# Patient Record
Sex: FEMALE | Race: OTHER | NOT HISPANIC OR LATINO | Employment: FULL TIME | URBAN - METROPOLITAN AREA
[De-identification: names, ages, dates, MRNs, and addresses within clinical notes are randomized per-mention and may not be internally consistent; named-entity substitution may affect disease eponyms.]

---

## 2022-12-21 ENCOUNTER — OFFICE VISIT (OUTPATIENT)
Dept: CARDIOLOGY | Facility: CLINIC | Age: 27
End: 2022-12-21
Payer: OTHER GOVERNMENT

## 2022-12-21 VITALS
OXYGEN SATURATION: 100 % | SYSTOLIC BLOOD PRESSURE: 120 MMHG | HEART RATE: 81 BPM | DIASTOLIC BLOOD PRESSURE: 86 MMHG | WEIGHT: 129.75 LBS | RESPIRATION RATE: 16 BRPM

## 2022-12-21 DIAGNOSIS — I10 PRIMARY HYPERTENSION: Primary | ICD-10-CM

## 2022-12-21 DIAGNOSIS — I10 HYPERTENSION, UNSPECIFIED TYPE: ICD-10-CM

## 2022-12-21 PROCEDURE — 99999 PR PBB SHADOW E&M-NEW PATIENT-LVL III: CPT | Mod: PBBFAC,,, | Performed by: INTERNAL MEDICINE

## 2022-12-21 PROCEDURE — 93010 EKG 12-LEAD: ICD-10-PCS | Mod: S$PBB,,, | Performed by: INTERNAL MEDICINE

## 2022-12-21 PROCEDURE — 99204 PR OFFICE/OUTPT VISIT, NEW, LEVL IV, 45-59 MIN: ICD-10-PCS | Mod: S$PBB,,, | Performed by: INTERNAL MEDICINE

## 2022-12-21 PROCEDURE — 99999 PR PBB SHADOW E&M-NEW PATIENT-LVL III: ICD-10-PCS | Mod: PBBFAC,,, | Performed by: INTERNAL MEDICINE

## 2022-12-21 PROCEDURE — 99204 OFFICE O/P NEW MOD 45 MIN: CPT | Mod: S$PBB,,, | Performed by: INTERNAL MEDICINE

## 2022-12-21 PROCEDURE — 93005 ELECTROCARDIOGRAM TRACING: CPT | Mod: PBBFAC | Performed by: INTERNAL MEDICINE

## 2022-12-21 PROCEDURE — 93010 ELECTROCARDIOGRAM REPORT: CPT | Mod: S$PBB,,, | Performed by: INTERNAL MEDICINE

## 2022-12-21 PROCEDURE — 99203 OFFICE O/P NEW LOW 30 MIN: CPT | Mod: PBBFAC | Performed by: INTERNAL MEDICINE

## 2022-12-21 RX ORDER — HYDRALAZINE HYDROCHLORIDE 25 MG/1
25 TABLET, FILM COATED ORAL
Qty: 90 TABLET | Refills: 3 | Status: SHIPPED | OUTPATIENT
Start: 2022-12-21 | End: 2022-12-21

## 2022-12-21 RX ORDER — SERTRALINE HYDROCHLORIDE 100 MG/1
150 TABLET, FILM COATED ORAL
COMMUNITY
Start: 2022-12-10 | End: 2023-01-04 | Stop reason: SDUPTHER

## 2022-12-21 RX ORDER — PROPRANOLOL HYDROCHLORIDE 20 MG/1
20 TABLET ORAL
Qty: 90 TABLET | Refills: 11 | Status: SHIPPED | OUTPATIENT
Start: 2022-12-21 | End: 2023-01-04

## 2022-12-21 RX ORDER — BUSPIRONE HYDROCHLORIDE 10 MG/1
10 TABLET ORAL 2 TIMES DAILY
COMMUNITY
Start: 2022-12-16 | End: 2023-01-04 | Stop reason: SDUPTHER

## 2022-12-21 RX ORDER — HYDROCHLOROTHIAZIDE 12.5 MG/1
12.5 CAPSULE ORAL
COMMUNITY
Start: 2022-10-17 | End: 2023-01-04

## 2022-12-21 RX ORDER — METOPROLOL SUCCINATE 25 MG/1
25 TABLET, EXTENDED RELEASE ORAL DAILY
Qty: 90 TABLET | Refills: 3 | Status: SHIPPED | OUTPATIENT
Start: 2022-12-21 | End: 2024-01-22 | Stop reason: SDUPTHER

## 2022-12-21 RX ORDER — HYDRALAZINE HYDROCHLORIDE 25 MG/1
25 TABLET, FILM COATED ORAL 3 TIMES DAILY
Qty: 90 TABLET | Refills: 3 | Status: SHIPPED | OUTPATIENT
Start: 2022-12-21 | End: 2022-12-21

## 2022-12-21 NOTE — PROGRESS NOTES
CARDIOVASCULAR CONSULTATION    REASON FOR CONSULT:   Shweta Jimenez is a 27 y.o. female who presents for evaluation    HISTORY OF PRESENT ILLNESS:     Patient is a pleasant 27-year-old lady.  Here for evaluation.  She was diagnosed with hypertension at the age of 19.  Took hydrochlorothiazide for 2 years and then stop taking hydrochlorothiazide because her blood pressure was well controlled.  She does not remember if secondary causes of hypertension were ever studied on her.  Now lately has been experiencing episodes of hypertension as high as 200 mmHg.  Associated with sweating.  She states that she has a history of panic attacks and is not sure if her panic is precipitating her hypertension on not.  She takes propranolol p.r.n. for her panic attacks and elevated blood pressure and that seems to work well for her      PAST MEDICAL HISTORY:   No past medical history on file.    PAST SURGICAL HISTORY:   No past surgical history on file.    ALLERGIES AND MEDICATION:   Review of patient's allergies indicates:  No Known Allergies     Medication List            Accurate as of December 21, 2022  1:57 PM. If you have any questions, ask your nurse or doctor.                START taking these medications      metoprolol succinate 25 MG 24 hr tablet  Commonly known as: TOPROL-XL  Take 1 tablet (25 mg total) by mouth once daily.  Started by: Stan Thompson MD     propranoloL 20 MG tablet  Commonly known as: INDERAL  Take 1 tablet (20 mg total) by mouth as needed (for htn).  Started by: Stan Thompson MD            CONTINUE taking these medications      busPIRone 10 MG tablet  Commonly known as: BUSPAR     hydroCHLOROthiazide 12.5 mg capsule  Commonly known as: MICROZIDE     sertraline 100 MG tablet  Commonly known as: ZOLOFT               Where to Get Your Medications        These medications were sent to [id number] - [place] - [address] [facility]  [address],  [place] [address]      Phone: [phone]   metoprolol succinate 25 MG 24 hr tablet  propranoloL 20 MG tablet         SOCIAL HISTORY:     Social History     Socioeconomic History    Marital status: Single       FAMILY HISTORY:   No family history on file.    REVIEW OF SYSTEMS:   Review of Systems   Constitutional: Negative.   HENT: Negative.     Eyes: Negative.    Respiratory: Negative.     Endocrine: Negative.    Hematologic/Lymphatic: Negative.    Skin: Negative.    Musculoskeletal: Negative.    Gastrointestinal: Negative.    Genitourinary: Negative.    Neurological: Negative.    Psychiatric/Behavioral: Negative.     Allergic/Immunologic: Negative.      A 10 point review of systems was performed and all the pertinent positives have been mentioned. Rest of review of systems was negative.        PHYSICAL EXAM:     Vitals:    12/21/22 1315   BP: 120/86   Pulse: 81   Resp: 16    There is no height or weight on file to calculate BMI.  Weight: 58.8 kg (129 lb 11.9 oz)         Physical Exam  Constitutional:       Appearance: Normal appearance. She is well-developed.   HENT:      Head: Normocephalic.   Eyes:      Pupils: Pupils are equal, round, and reactive to light.   Cardiovascular:      Rate and Rhythm: Normal rate and regular rhythm.   Pulmonary:      Effort: Pulmonary effort is normal.      Breath sounds: Normal breath sounds.   Abdominal:      General: Bowel sounds are normal.      Palpations: Abdomen is soft.      Tenderness: There is no abdominal tenderness.   Musculoskeletal:         General: Normal range of motion.      Cervical back: Normal range of motion and neck supple.   Skin:     General: Skin is warm.   Neurological:      Mental Status: She is alert and oriented to person, place, and time.         DATA:     Laboratory:  CBC:        CHEMISTRIES:        CARDIAC BIOMARKERS:        COAGS:        LIPIDS/LFTS:  Recent Labs   Lab 10/17/22  1137   Cholesterol 170   Triglycerides 53   HDL 57   LDL Calculated  102   Non-HDL Cholesterol 113       Hemoglobin A1C   Date Value Ref Range Status   10/17/2022 5.2 4.7 - 5.6 % Final       TSH        The ASCVD Risk score (Farideh DK, et al., 2019) failed to calculate for the following reasons:    The 2019 ASCVD risk score is only valid for ages 40 to 79             ASSESSMENT AND PLAN     Patient Active Problem List   Diagnosis    Hypertension     Patient with hypertension.  Rule out secondary causes of hypertension.  Check renal artery ultrasound.  Check 2D echocardiogram.  Check renin aldosterone ratio.  Check 24 hour urine metanephrine levels.  Propranolol p.r.n..  Switch from hydrochlorothiazide to Toprol-XL 25 mg daily titrate as needed.    Follow-up after testing            Thank you very much for involving me in the care of your patient.  Please do not hesitate to contact me if there are any questions.      Stan Thompson MD, FACC, [facility]  Interventional Cardiologist, [facility].           This note was dictated with the help of speech recognition software.  There might be un-intended errors and/or substitutions.

## 2022-12-22 ENCOUNTER — HOSPITAL ENCOUNTER (OUTPATIENT)
Dept: CARDIOLOGY | Facility: HOSPITAL | Age: 27
Discharge: HOME | End: 2022-12-22
Attending: INTERNAL MEDICINE
Payer: OTHER GOVERNMENT

## 2022-12-22 DIAGNOSIS — I10 PRIMARY HYPERTENSION: ICD-10-CM

## 2022-12-22 LAB
ABDOMINAL AORTA MID PSV: 87 CM/S
AV INDEX (PROSTH): 0.8
AV MEAN GRADIENT: 3 MMHG
AV PEAK GRADIENT: 5 MMHG
AV VALVE AREA: 2.94 CM2
AV VELOCITY RATIO: 0.68
CV ECHO LV RWT: 0.31 CM
DOP CALC AO PEAK VEL: 1.14 M/S
DOP CALC AO VTI: 20.5 CM
DOP CALC LVOT AREA: 3.7 CM2
DOP CALC LVOT DIAMETER: 2.17 CM
DOP CALC LVOT PEAK VEL: 0.78 M/S
DOP CALC LVOT STROKE VOLUME: 60.25 CM3
DOP CALCLVOT PEAK VEL VTI: 16.3 CM
E WAVE DECELERATION TIME: 205.76 MSEC
E/A RATIO: 1.91
E/E' RATIO: 5.13 M/S
ECHO LV POSTERIOR WALL: 0.7 CM (ref 0.6–1.1)
EJECTION FRACTION: 60 %
FRACTIONAL SHORTENING: 32 % (ref 28–44)
INTERVENTRICULAR SEPTUM: 0.79 CM (ref 0.6–1.1)
IVRT: 110.37 MSEC
LA MINOR: 4.59 CM
LA WIDTH: 4.6 CM
LEFT ATRIUM SIZE: 3.92 CM
LEFT INTERNAL DIMENSION IN SYSTOLE: 3.06 CM (ref 2.1–4)
LEFT RENAL DIST DIAS: 29 CM/S
LEFT RENAL DIST SYS: 75 CM/S
LEFT RENAL MID DIAS: 24 CM/S
LEFT RENAL MID RAR: 0.67
LEFT RENAL MID SYS: 58 CM/S
LEFT RENAL ORIGIN DIAS: 46 CM/S
LEFT RENAL ORIGIN SYS: 111 CM/S
LEFT RENAL PROX DIAS: 67 CM/S
LEFT RENAL PROX SYS: 132 CM/S
LEFT RENAL ULTRASOUND ACCELERATION TIME MEASUREMENT 1: 66 MS
LEFT RENAL ULTRASOUND ACCELERATION TIME MEASUREMENT 2: 24 MS
LEFT RENAL ULTRASOUND ACCELERATION TIME MEASUREMENT 3: 66 MS
LEFT RENAL ULTRASOUND ACCELERATION TIME MEASUREMENT AVERAGE: 66 MS
LEFT RENAL ULTRASOUND KIDNEY SIZE MEASUREMENT 1: 10.2 CM
LEFT RENAL ULTRASOUND KIDNEY SIZE MEASUREMENT 2: 10.1 CM
LEFT RENAL ULTRASOUND KIDNEY SIZE MEASUREMENT 3: 9.8 CM
LEFT RENAL ULTRASOUND KIDNEY SIZE MEASUREMENT AVERAGE: 10.2 CM
LEFT RENAL ULTRASOUND RESISTIVE INDEX MEASUREMENT 1: 0.52
LEFT RENAL ULTRASOUND RESISTIVE INDEX MEASUREMENT 2: 0.56
LEFT RENAL ULTRASOUND RESISTIVE INDEX MEASUREMENT 3: 0.59
LEFT RENAL ULTRASOUND RESISTIVE INDEX MEASUREMENT AVERAGE: 0.59
LEFT VENTRICLE DIASTOLIC VOLUME: 91.34 ML
LEFT VENTRICLE SYSTOLIC VOLUME: 36.63 ML
LEFT VENTRICULAR INTERNAL DIMENSION IN DIASTOLE: 4.48 CM (ref 3.5–6)
LEFT VENTRICULAR MASS: 102.82 G
LV LATERAL E/E' RATIO: 4.56 M/S
LV SEPTAL E/E' RATIO: 5.86 M/S
LVOT MG: 1.44 MMHG
LVOT MV: 0.58 CM/S
MID AORTIC TRANS: 1.3 CM
MV PEAK A VEL: 0.43 M/S
MV PEAK E VEL: 0.82 M/S
MV STENOSIS PRESSURE HALF TIME: 59.67 MS
MV VALVE AREA P 1/2 METHOD: 3.69 CM2
OHS CV LEFT RENAL RAR: 1.52
OHS CV RIGHT RENAL RAR: 0.82
OHS CV US LEFT RENAL HIGHEST EDV: 67
OHS CV US LEFT RENAL HIGHEST PSV: 132
OHS CV US RIGHT RENAL HIGHEST EDV: 30
OHS CV US RIGHT RENAL HIGHEST PSV: 71
PISA TR MAX VEL: 2.36 M/S
PV PEAK VELOCITY: 1.13 CM/S
RA MAJOR: 4.82 CM
RA PRESSURE: 3 MMHG
RA WIDTH: 3.94 CM
RIGHT RENAL DIST DIAS: 30 CM/S
RIGHT RENAL DIST SYS: 70 CM/S
RIGHT RENAL MID DIAS: 28 CM/S
RIGHT RENAL MID RAR: 0.8
RIGHT RENAL MID SYS: 70 CM/S
RIGHT RENAL ORIGIN DIAS: 21 CM/S
RIGHT RENAL ORIGIN SYS: 71 CM/S
RIGHT RENAL PROX DIAS: 20 CM/S
RIGHT RENAL PROX SYS: 71 CM/S
RIGHT RENAL ULTRASOUND ACCELERATION TIME MEASUREMENT 1: 114 MS
RIGHT RENAL ULTRASOUND ACCELERATION TIME MEASUREMENT 2: 84 MS
RIGHT RENAL ULTRASOUND ACCELERATION TIME MEASUREMENT 3: 72 MS
RIGHT RENAL ULTRASOUND ACCELERATION TIME MEASUREMENT AVERAGE: 114 MS
RIGHT RENAL ULTRASOUND KIDNEY SIZE MEASUREMENT 1: 11.4 CM
RIGHT RENAL ULTRASOUND KIDNEY SIZE MEASUREMENT 2: 10.6 CM
RIGHT RENAL ULTRASOUND KIDNEY SIZE MEASUREMENT 3: 10.4 CM
RIGHT RENAL ULTRASOUND KIDNEY SIZE MEASUREMENT AVERAGE: 11.4 CM
RIGHT RENAL ULTRASOUND RESISTIVE INDEX MEASUREMENT 1: 0.54
RIGHT RENAL ULTRASOUND RESISTIVE INDEX MEASUREMENT 2: 0.66
RIGHT RENAL ULTRASOUND RESISTIVE INDEX MEASUREMENT 3: 0.56
RIGHT RENAL ULTRASOUND RESISTIVE INDEX MEASUREMENT AVERAGE: 0.66
RIGHT VENTRICULAR END-DIASTOLIC DIMENSION: 3.91 CM
SINUS: 3.17 CM
STJ: 2.87 CM
TDI LATERAL: 0.18 M/S
TDI SEPTAL: 0.14 M/S
TDI: 0.16 M/S
TR MAX PG: 22 MMHG
TRICUSPID ANNULAR PLANE SYSTOLIC EXCURSION: 1.97 CM
TV REST PULMONARY ARTERY PRESSURE: 25 MMHG

## 2022-12-22 PROCEDURE — 93306 TTE W/DOPPLER COMPLETE: CPT | Mod: 26,,, | Performed by: INTERNAL MEDICINE

## 2022-12-22 PROCEDURE — 93975 VASCULAR STUDY: CPT

## 2022-12-22 PROCEDURE — 93306 TTE W/DOPPLER COMPLETE: CPT

## 2022-12-22 PROCEDURE — 93975 CV US RENAL ARTERY STENOSIS HYPERTENSION COMPLETE (CUPID ONLY): ICD-10-PCS | Mod: 26,,, | Performed by: INTERNAL MEDICINE

## 2022-12-22 PROCEDURE — 93306 ECHO (CUPID ONLY): ICD-10-PCS | Mod: 26,,, | Performed by: INTERNAL MEDICINE

## 2022-12-22 PROCEDURE — 93975 VASCULAR STUDY: CPT | Mod: 26,,, | Performed by: INTERNAL MEDICINE

## 2023-01-03 NOTE — PROGRESS NOTES
Subjective:       Patient ID: Shweta Dela Cruz is a 27 y.o. female.    Chief Complaint: Establish Care, Anxiety (Daily ), and Panic Attack (Often and it may be partial because of work. 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 )        Shweta Dela Cruz is a 27 y.o. female who presents today for evaluation/treatment of anxiety.     Anxiety  Patient is here for evaluation of anxiety.  She has the following anxiety symptoms: shortness of breath, shaking, ect . Onset of symptoms was approximately several years ago.  Symptoms have been stable since that time. She is on Metoprolol daily to help with her blood pressure and control her anxiety. Additionally she is on Buspar and Zoloft. She feels her baseline anxiety is relatively well controlled but notes she is having panic attacks. 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      Past Medical History:   Diagnosis Date    Anxiety     Depression     Hypertension      Past Surgical History:   Procedure Laterality Date    ASD REPAIR  2003     Social History     Socioeconomic History    Marital status: Single   Tobacco Use    Smoking status: Never    Smokeless tobacco: Never   Substance and Sexual Activity    Alcohol use: Yes    Drug use: Never    Sexual activity: Yes     Partners: Female     Birth control/protection: None     History reviewed. No pertinent family history.      Health Maintenance    Flu Vaccine: 10/03/2022  Tetanus/Tdap: 09/09/2022  Hepatitis C Screen: 10/17/2022  HIV Screen: 10/17/2022      Patient ambulates on her own without an assistive device.     Have you ever used tobacco products? No    Have you ever been screened for HIV? Yes    Do you use condoms for protection against STD No    If female and sex with a male partner, do either of you use protection against pregnancy?  N/A    Do you still have a menstrual cycle? Yes          If yes, please describe your cycles: Regular    How often do  "you drink alcohol?  Very rarely    Review of patient's allergies indicates:  No Known Allergies    Medication List with Changes/Refills   Current Medications    METOPROLOL SUCCINATE (TOPROL-XL) 25 MG 24 HR TABLET    Take 1 tablet (25 mg total) by mouth once daily.   Changed and/or Refilled Medications    Modified Medication Previous Medication    BUSPIRONE (BUSPAR) 10 MG TABLET busPIRone (BUSPAR) 10 MG tablet       Take 1 tablet (10 mg total) by mouth 3 (three) times daily.    Take 10 mg by mouth 2 (two) times daily.    SERTRALINE (ZOLOFT) 100 MG TABLET sertraline (ZOLOFT) 100 MG tablet       Take 1.5 tablets (150 mg total) by mouth once daily.    Take 150 mg by mouth.   Discontinued Medications    HYDROCHLOROTHIAZIDE (MICROZIDE) 12.5 MG CAPSULE    Take 12.5 mg by mouth.    PROPRANOLOL (INDERAL) 20 MG TABLET    Take 1 tablet (20 mg total) by mouth as needed (for htn).       Review of Systems   Constitutional:  Negative for chills and fever.   Respiratory:  Negative for cough and shortness of breath.    Cardiovascular:  Negative for chest pain.   Neurological:  Negative for dizziness and headaches.   Psychiatric/Behavioral:  The patient is nervous/anxious.      Objective:   /82 (BP Location: Right arm, Patient Position: Sitting, BP Method: Small (Manual))   Pulse 61   Resp 18   Ht 5' 2.5" (1.588 m)   Wt 57.3 kg (126 lb 5.2 oz)   LMP 12/10/2022   SpO2 99%   BMI 22.74 kg/m²     Physical Exam  Vitals reviewed.   Constitutional:       Appearance: Normal appearance.   HENT:      Head: Normocephalic and atraumatic.   Cardiovascular:      Rate and Rhythm: Normal rate and regular rhythm.      Heart sounds: Normal heart sounds. No murmur heard.  Pulmonary:      Effort: Pulmonary effort is normal.      Breath sounds: Normal breath sounds. No wheezing.   Abdominal:      General: Bowel sounds are normal.   Skin:     General: Skin is warm and dry.   Neurological:      Mental Status: She is alert and oriented to " person, place, and time.     Assessment and Plan:       1. AUGUSTUS (generalized anxiety disorder)    Chronic, currently not well controlled. Will increase Buspar from BID to TID. Monitor for symptom improvement.     - busPIRone (BUSPAR) 10 MG tablet; Take 1 tablet (10 mg total) by mouth 3 (three) times daily.  Dispense: 270 tablet; Refill: 3    2. Depression, unspecified depression type    Chronic, stable. Recently in the past 3 months went up to 150 mg per day. Will keep at this dose for now and titrate up Buspar. Can increase to 200 mg if necessary.     - sertraline (ZOLOFT) 100 MG tablet; Take 1.5 tablets (150 mg total) by mouth once daily.  Dispense: 135 tablet; Refill: 3    3. Essential hypertension    Chronic, stable/improved, continue Metoprolol, f/u with Cardiology    Patient had recent labs at [facility] in 10/2022 -- to make appt with MD in clinic for annual once new insurance is in effect.

## 2023-01-04 ENCOUNTER — OFFICE VISIT (OUTPATIENT)
Dept: INTERNAL MEDICINE | Facility: CLINIC | Age: 28
End: 2023-01-04
Payer: OTHER GOVERNMENT

## 2023-01-04 VITALS
DIASTOLIC BLOOD PRESSURE: 82 MMHG | SYSTOLIC BLOOD PRESSURE: 100 MMHG | OXYGEN SATURATION: 99 % | RESPIRATION RATE: 18 BRPM | HEIGHT: 63 IN | HEART RATE: 61 BPM | BODY MASS INDEX: 22.38 KG/M2 | WEIGHT: 126.31 LBS

## 2023-01-04 DIAGNOSIS — F41.1 GAD (GENERALIZED ANXIETY DISORDER): Primary | ICD-10-CM

## 2023-01-04 DIAGNOSIS — I10 ESSENTIAL HYPERTENSION: ICD-10-CM

## 2023-01-04 DIAGNOSIS — F32.A DEPRESSION, UNSPECIFIED DEPRESSION TYPE: ICD-10-CM

## 2023-01-04 PROCEDURE — 99213 OFFICE O/P EST LOW 20 MIN: CPT | Mod: PBBFAC,PO | Performed by: NURSE PRACTITIONER

## 2023-01-04 PROCEDURE — 99204 PR OFFICE/OUTPT VISIT, NEW, LEVL IV, 45-59 MIN: ICD-10-PCS | Mod: S$PBB,,, | Performed by: NURSE PRACTITIONER

## 2023-01-04 PROCEDURE — 99999 PR PBB SHADOW E&M-EST. PATIENT-LVL III: CPT | Mod: PBBFAC,,, | Performed by: NURSE PRACTITIONER

## 2023-01-04 PROCEDURE — 99999 PR PBB SHADOW E&M-EST. PATIENT-LVL III: ICD-10-PCS | Mod: PBBFAC,,, | Performed by: NURSE PRACTITIONER

## 2023-01-04 PROCEDURE — 99204 OFFICE O/P NEW MOD 45 MIN: CPT | Mod: S$PBB,,, | Performed by: NURSE PRACTITIONER

## 2023-01-04 RX ORDER — BUSPIRONE HYDROCHLORIDE 10 MG/1
10 TABLET ORAL 3 TIMES DAILY
Qty: 270 TABLET | Refills: 3 | Status: SHIPPED | OUTPATIENT
Start: 2023-01-04 | End: 2024-01-22

## 2023-01-04 RX ORDER — SERTRALINE HYDROCHLORIDE 100 MG/1
150 TABLET, FILM COATED ORAL DAILY
Qty: 135 TABLET | Refills: 3 | Status: SHIPPED | OUTPATIENT
Start: 2023-01-04 | End: 2024-01-19 | Stop reason: SDUPTHER

## 2023-01-05 ENCOUNTER — OFFICE VISIT (OUTPATIENT)
Dept: CARDIOLOGY | Facility: CLINIC | Age: 28
End: 2023-01-05
Payer: OTHER GOVERNMENT

## 2023-01-05 VITALS
RESPIRATION RATE: 18 BRPM | BODY MASS INDEX: 22.71 KG/M2 | HEART RATE: 62 BPM | SYSTOLIC BLOOD PRESSURE: 112 MMHG | OXYGEN SATURATION: 98 % | HEIGHT: 63 IN | WEIGHT: 128.19 LBS | DIASTOLIC BLOOD PRESSURE: 62 MMHG

## 2023-01-05 DIAGNOSIS — I10 ESSENTIAL HYPERTENSION: ICD-10-CM

## 2023-01-05 DIAGNOSIS — I10 PRIMARY HYPERTENSION: Primary | ICD-10-CM

## 2023-01-05 PROCEDURE — 99214 OFFICE O/P EST MOD 30 MIN: CPT | Mod: S$PBB,,, | Performed by: INTERNAL MEDICINE

## 2023-01-05 PROCEDURE — 99999 PR PBB SHADOW E&M-EST. PATIENT-LVL IV: CPT | Mod: PBBFAC,,, | Performed by: INTERNAL MEDICINE

## 2023-01-05 PROCEDURE — 99214 PR OFFICE/OUTPT VISIT, EST, LEVL IV, 30-39 MIN: ICD-10-PCS | Mod: S$PBB,,, | Performed by: INTERNAL MEDICINE

## 2023-01-05 PROCEDURE — 99214 OFFICE O/P EST MOD 30 MIN: CPT | Mod: PBBFAC | Performed by: INTERNAL MEDICINE

## 2023-01-05 PROCEDURE — 99999 PR PBB SHADOW E&M-EST. PATIENT-LVL IV: ICD-10-PCS | Mod: PBBFAC,,, | Performed by: INTERNAL MEDICINE

## 2023-01-05 NOTE — PROGRESS NOTES
CARDIOVASCULAR CONSULTATION    REASON FOR CONSULT:   Shweta Dela Cruz is a 27 y.o. female who presents for evaluation    HISTORY OF PRESENT ILLNESS:     Patient is a pleasant 27-year-old lady.  Here for evaluation.  She was diagnosed with hypertension at the age of 19.  Took hydrochlorothiazide for 2 years and then stop taking hydrochlorothiazide because her blood pressure was well controlled.  She does not remember if secondary causes of hypertension were ever studied on her.  Now lately has been experiencing episodes of hypertension as high as 200 mmHg.  Associated with sweating.  She states that she has a history of panic attacks and is not sure if her panic is precipitating her hypertension on not.  She takes propranolol p.r.n. for her panic attacks and elevated blood pressure and that seems to work well for her    From January 23: Patient here for follow-up.  Has not had any further episodes of hypertension since being started on metoprolol.  Feels much better.  Echo was normal.  No renal artery stenosis was seen.  Renin aldosterone ratio was 13.  Metanephrine levels are pending and patient states that she is changing insurances and will get it done once the new insurance gets activated.            PAST MEDICAL HISTORY:     Past Medical History:   Diagnosis Date    Anxiety     Depression     Hypertension        PAST SURGICAL HISTORY:     Past Surgical History:   Procedure Laterality Date    ASD REPAIR  2003       ALLERGIES AND MEDICATION:   Review of patient's allergies indicates:  No Known Allergies     Medication List            Accurate as of January 5, 2023  9:55 AM. If you have any questions, ask your nurse or doctor.                CONTINUE taking these medications      busPIRone 10 MG tablet  Commonly known as: BUSPAR  Take 1 tablet (10 mg total) by mouth 3 (three) times daily.     metoprolol succinate 25 MG 24 hr tablet  Commonly known as: TOPROL-XL  Take 1 tablet (25 mg total) by mouth once daily.    "  sertraline 100 MG tablet  Commonly known as: ZOLOFT  Take 1.5 tablets (150 mg total) by mouth once daily.              SOCIAL HISTORY:     Social History     Socioeconomic History    Marital status: Single   Tobacco Use    Smoking status: Never    Smokeless tobacco: Never   Substance and Sexual Activity    Alcohol use: Yes    Drug use: Never    Sexual activity: Yes     Partners: Female     Birth control/protection: None       FAMILY HISTORY:   No family history on file.    REVIEW OF SYSTEMS:   Review of Systems   Constitutional: Negative.   HENT: Negative.     Eyes: Negative.    Respiratory: Negative.     Endocrine: Negative.    Hematologic/Lymphatic: Negative.    Skin: Negative.    Musculoskeletal: Negative.    Gastrointestinal: Negative.    Genitourinary: Negative.    Neurological: Negative.    Psychiatric/Behavioral: Negative.     Allergic/Immunologic: Negative.      A 10 point review of systems was performed and all the pertinent positives have been mentioned. Rest of review of systems was negative.        PHYSICAL EXAM:     Vitals:    01/05/23 0944   BP: 112/62   Pulse: 62   Resp: 18    Body mass index is 23.07 kg/m².  Weight: 58.2 kg (128 lb 3.2 oz)   Height: 5' 2.5" (158.8 cm)     Physical Exam  Constitutional:       Appearance: Normal appearance. She is well-developed.   HENT:      Head: Normocephalic.   Eyes:      Pupils: Pupils are equal, round, and reactive to light.   Cardiovascular:      Rate and Rhythm: Normal rate and regular rhythm.   Pulmonary:      Effort: Pulmonary effort is normal.      Breath sounds: Normal breath sounds.   Abdominal:      General: Bowel sounds are normal.      Palpations: Abdomen is soft.      Tenderness: There is no abdominal tenderness.   Musculoskeletal:         General: Normal range of motion.      Cervical back: Normal range of motion and neck supple.   Skin:     General: Skin is warm.   Neurological:      Mental Status: She is alert and oriented to person, place, and " time.         DATA:     Laboratory:  CBC:        CHEMISTRIES:        CARDIAC BIOMARKERS:        COAGS:        LIPIDS/LFTS:  Recent Labs   Lab 10/17/22  1137   Cholesterol 170   Triglycerides 53   HDL 57   LDL Calculated 102   Non-HDL Cholesterol 113         Hemoglobin A1C   Date Value Ref Range Status   10/17/2022 5.2 4.7 - 5.6 % Final       TSH        The ASCVD Risk score (Farideh DK, et al., 2019) failed to calculate for the following reasons:    The 2019 ASCVD risk score is only valid for ages 40 to 79             ASSESSMENT AND PLAN     Patient Active Problem List   Diagnosis    Essential hypertension     Patient with hypertension.  Blood pressure much better controlled on Toprol-XL.  Continue Toprol-XL, titrate as needed.  Hydralazine p.r.n. renin aldosterone ratio is normal.  Urine metanephrine levels are pending and patient states she will get them done once she gets the new insurance.  No renal artery stenosis.  Normal LVEF    Follow-up in 6 months            Thank you very much for involving me in the care of your patient.  Please do not hesitate to contact me if there are any questions.      Stan Thompson MD, FACC, [facility]  Interventional Cardiologist, [facility].           This note was dictated with the help of speech recognition software.  There might be un-intended errors and/or substitutions.

## 2023-06-20 ENCOUNTER — PATIENT MESSAGE (OUTPATIENT)
Dept: INTERNAL MEDICINE | Facility: CLINIC | Age: 28
End: 2023-06-20
Payer: OTHER GOVERNMENT

## 2023-06-21 DIAGNOSIS — I10 HYPERTENSION, UNSPECIFIED TYPE: Primary | ICD-10-CM

## 2023-08-11 ENCOUNTER — PATIENT MESSAGE (OUTPATIENT)
Dept: INTERNAL MEDICINE | Facility: CLINIC | Age: 28
End: 2023-08-11
Payer: COMMERCIAL

## 2023-08-16 ENCOUNTER — OFFICE VISIT (OUTPATIENT)
Dept: OBSTETRICS AND GYNECOLOGY | Facility: CLINIC | Age: 28
End: 2023-08-16
Payer: COMMERCIAL

## 2023-08-16 ENCOUNTER — PATIENT MESSAGE (OUTPATIENT)
Dept: OBSTETRICS AND GYNECOLOGY | Facility: CLINIC | Age: 28
End: 2023-08-16

## 2023-08-16 VITALS
BODY MASS INDEX: 22.94 KG/M2 | SYSTOLIC BLOOD PRESSURE: 110 MMHG | WEIGHT: 127.44 LBS | DIASTOLIC BLOOD PRESSURE: 76 MMHG

## 2023-08-16 DIAGNOSIS — N92.0 MENORRHAGIA WITH REGULAR CYCLE: ICD-10-CM

## 2023-08-16 DIAGNOSIS — Z01.419 WELL WOMAN EXAM WITH ROUTINE GYNECOLOGICAL EXAM: Primary | ICD-10-CM

## 2023-08-16 DIAGNOSIS — F32.81 PMDD (PREMENSTRUAL DYSPHORIC DISORDER): ICD-10-CM

## 2023-08-16 PROCEDURE — 99999 PR PBB SHADOW E&M-EST. PATIENT-LVL III: ICD-10-PCS | Mod: PBBFAC,,, | Performed by: STUDENT IN AN ORGANIZED HEALTH CARE EDUCATION/TRAINING PROGRAM

## 2023-08-16 PROCEDURE — 99213 OFFICE O/P EST LOW 20 MIN: CPT | Mod: PBBFAC,PO | Performed by: STUDENT IN AN ORGANIZED HEALTH CARE EDUCATION/TRAINING PROGRAM

## 2023-08-16 PROCEDURE — 99385 PREV VISIT NEW AGE 18-39: CPT | Mod: S$GLB,,, | Performed by: STUDENT IN AN ORGANIZED HEALTH CARE EDUCATION/TRAINING PROGRAM

## 2023-08-16 PROCEDURE — 99999 PR PBB SHADOW E&M-EST. PATIENT-LVL III: CPT | Mod: PBBFAC,,, | Performed by: STUDENT IN AN ORGANIZED HEALTH CARE EDUCATION/TRAINING PROGRAM

## 2023-08-16 PROCEDURE — 99385 PR PREVENTIVE VISIT,NEW,18-39: ICD-10-PCS | Mod: S$GLB,,, | Performed by: STUDENT IN AN ORGANIZED HEALTH CARE EDUCATION/TRAINING PROGRAM

## 2023-08-16 PROCEDURE — 88175 CYTOPATH C/V AUTO FLUID REDO: CPT | Performed by: STUDENT IN AN ORGANIZED HEALTH CARE EDUCATION/TRAINING PROGRAM

## 2023-08-16 NOTE — PROGRESS NOTES
CC: Well woman exam    HPI:  Shweta Dela Cruz is a 28 y.o. female  presents for a well woman exam.  She reports heavy cycles with passage of clots since she started her periods (normal for her). History of PMDD, she is currently on zoloft and it is helping but she is interested in the option of starting birth control as well. Female partners, does not need pregnancy protection and has used OCPs in past but felt like sometimes it worsened her PMDD symptoms.       Patient history:   Past Medical History:   Diagnosis Date    Anxiety     Depression     Hypertension      Past Surgical History:   Procedure Laterality Date    ASD REPAIR       OB History    Para Term  AB Living   0 0 0 0 0 0   SAB IAB Ectopic Multiple Live Births   0 0 0 0 0       GYN  Menopausal: No  History of abnormal paps: DENIES  Abnormal or postmenopausal bleeding:  menorrhagia  History of abnormal mammograms:N/A   Family history of breast or ovarian cancer: DENIES  Any breast masses, pain, skin changes, or nipple discharge: DENIES  Possible recent STD exposure: denies  Contraception: None    Pap: No result found, Done today  Mammogram: N/A      History reviewed. No pertinent family history.  Social History     Tobacco Use    Smoking status: Never    Smokeless tobacco: Never   Substance Use Topics    Alcohol use: Yes    Drug use: Never     Allergies: Patient has no known allergies.    Current Outpatient Medications:     busPIRone (BUSPAR) 10 MG tablet, Take 1 tablet (10 mg total) by mouth 3 (three) times daily., Disp: 270 tablet, Rfl: 3    metoprolol succinate (TOPROL-XL) 25 MG 24 hr tablet, Take 1 tablet (25 mg total) by mouth once daily., Disp: 90 tablet, Rfl: 3    sertraline (ZOLOFT) 100 MG tablet, Take 1.5 tablets (150 mg total) by mouth once daily., Disp: 135 tablet, Rfl: 3       ROS:  GENERAL: Denies weight gain or weight loss. Feeling well overall.   SKIN: Denies rash or lesions.   HEAD: Denies head injury or  headache.   NODES: Denies enlarged lymph nodes.   CHEST: Denies chest pain or shortness of breath.   CARDIOVASCULAR: Denies palpitations or left sided chest pain.   ABDOMEN: No abdominal pain, constipation, diarrhea, nausea, vomiting or rectal bleeding.   URINARY: No frequency, dysuria, hematuria, or burning on urination.  REPRODUCTIVE: See HPI.   BREASTS: The patient performs breast self-examination and denies pain, lumps, or nipple discharge.   HEMATOLOGIC: No easy bruisability or excessive bleeding.  MUSCULOSKELETAL: Denies joint pain or swelling.   NEUROLOGIC: Denies syncope or weakness.   PSYCHIATRIC: Denies depression, anxiety or mood swings.    Objective:   /76   Wt 57.8 kg (127 lb 6.8 oz)   LMP 08/14/2023 (Exact Date)   BMI 22.94 kg/m²       Physical Exam:  APPEARANCE: Well nourished, well developed, in no acute distress.  AFFECT: WNL, alert and oriented x 3  SKIN: No acne or hirsutism  NECK: Neck symmetric without masses or thyromegaly  NODES: No inguinal, cervical, axillary, or femoral lymph node enlargement  CHEST: Good respiratory effect  ABDOMEN: Soft.  No tenderness or masses.  No hepatosplenomegaly.  No hernias.  BREASTS: Symmetrical, no skin changes or visible lesions.  No palpable masses, nipple discharge bilaterally.  PELVIC: Normal external genitalia without lesions.  Normal hair distribution.  Adequate perineal body, normal urethral meatus.  Vagina moist and well rugated without lesions or discharge.  Cervix pink, without lesions, discharge or tenderness.  No significant cystocele or rectocele.  Bimanual exam shows uterus to be normal size, regular, mobile and nontender.  Adnexa without masses or tenderness.   EXTREMITIES: No edema.    ASSESSMENT AND PLAN  1. Well woman exam with routine gynecological exam  Liquid-Based Pap Smear, Screening      2. PMDD (premenstrual dysphoric disorder)        3. Menorrhagia with regular cycle            Annual exam  Breast and pelvic exam: WNL  Patient  counseled on ASCCP guidelines for cervical cytology screening  Cervical screening: completed today   Patient counseled on current recommendations for breast cancer screening  Mammogram screening: at 40  STD testing: declined, not needed today   Contraception: discussed option to try Tana (studied in PMDD) vs Lo loestrin (lowest estrogen dose) to help with heavy periods and PMDD symptoms, patient would like to consider and will notify MD if she wants to proceed   Osteoporosis screening at 65  Tobacco cessation counseling n/a    She was counseled to follow up with her PCP for other routine health maintenance      Follow up in about 1 year (around 8/16/2024).      Steffi Garcia MD  OBGYN [facility]

## 2023-08-24 LAB
FINAL PATHOLOGIC DIAGNOSIS: NORMAL
Lab: NORMAL

## 2023-08-30 ENCOUNTER — PATIENT MESSAGE (OUTPATIENT)
Dept: OBSTETRICS AND GYNECOLOGY | Facility: CLINIC | Age: 28
End: 2023-08-30
Payer: COMMERCIAL

## 2023-08-30 DIAGNOSIS — F32.81 PMDD (PREMENSTRUAL DYSPHORIC DISORDER): Primary | ICD-10-CM

## 2023-08-31 RX ORDER — NORETHINDRONE ACETATE AND ETHINYL ESTRADIOL, ETHINYL ESTRADIOL AND FERROUS FUMARATE 1MG-10(24)
1 KIT ORAL DAILY
Qty: 90 TABLET | Refills: 3 | Status: SHIPPED | OUTPATIENT
Start: 2023-08-31 | End: 2024-08-30

## 2024-01-19 DIAGNOSIS — F32.A DEPRESSION, UNSPECIFIED DEPRESSION TYPE: ICD-10-CM

## 2024-01-19 RX ORDER — SERTRALINE HYDROCHLORIDE 100 MG/1
150 TABLET, FILM COATED ORAL DAILY
Qty: 135 TABLET | Refills: 3 | OUTPATIENT
Start: 2024-01-19

## 2024-01-19 RX ORDER — SERTRALINE HYDROCHLORIDE 100 MG/1
150 TABLET, FILM COATED ORAL DAILY
Qty: 45 TABLET | Refills: 0 | Status: SHIPPED | OUTPATIENT
Start: 2024-01-19 | End: 2024-01-22 | Stop reason: SDUPTHER

## 2024-01-19 NOTE — TELEPHONE ENCOUNTER
LOV 1/4/23  LRF 1/4/23        Patient Comment: Jaki been on this prescription for over 10 years and need a refill. I dont believe an appointment is needed, but if need be I request an emergency refill for two weeks and can make an appointment within that time.

## 2024-01-19 NOTE — TELEPHONE ENCOUNTER
Please let patient know that when you are on medications for chronic conditions it is required that be seen at least once per year to continue getting refills. I will send in a 30 day supply to allow her time to either make an appointment with me or make an appointment with an MD to establish care.

## 2024-01-20 DIAGNOSIS — F41.1 GAD (GENERALIZED ANXIETY DISORDER): ICD-10-CM

## 2024-01-22 ENCOUNTER — OFFICE VISIT (OUTPATIENT)
Dept: INTERNAL MEDICINE | Facility: CLINIC | Age: 29
End: 2024-01-22
Payer: COMMERCIAL

## 2024-01-22 ENCOUNTER — TELEPHONE (OUTPATIENT)
Dept: INTERNAL MEDICINE | Facility: CLINIC | Age: 29
End: 2024-01-22

## 2024-01-22 DIAGNOSIS — F41.1 GAD (GENERALIZED ANXIETY DISORDER): ICD-10-CM

## 2024-01-22 DIAGNOSIS — F32.A DEPRESSION, UNSPECIFIED DEPRESSION TYPE: ICD-10-CM

## 2024-01-22 DIAGNOSIS — I10 ESSENTIAL HYPERTENSION: Primary | ICD-10-CM

## 2024-01-22 PROCEDURE — 99214 OFFICE O/P EST MOD 30 MIN: CPT | Mod: 95,,, | Performed by: NURSE PRACTITIONER

## 2024-01-22 RX ORDER — METOPROLOL SUCCINATE 25 MG/1
25 TABLET, EXTENDED RELEASE ORAL DAILY
Qty: 90 TABLET | Refills: 0 | Status: SHIPPED | OUTPATIENT
Start: 2024-01-22 | End: 2024-03-13 | Stop reason: SDUPTHER

## 2024-01-22 RX ORDER — BUSPIRONE HYDROCHLORIDE 10 MG/1
10 TABLET ORAL 3 TIMES DAILY
Qty: 270 TABLET | Refills: 0 | Status: SHIPPED | OUTPATIENT
Start: 2024-01-22 | End: 2024-03-13 | Stop reason: SDUPTHER

## 2024-01-22 RX ORDER — SERTRALINE HYDROCHLORIDE 100 MG/1
150 TABLET, FILM COATED ORAL DAILY
Qty: 135 TABLET | Refills: 0 | Status: SHIPPED | OUTPATIENT
Start: 2024-01-22 | End: 2024-03-13 | Stop reason: SDUPTHER

## 2024-01-22 RX ORDER — BUSPIRONE HYDROCHLORIDE 10 MG/1
10 TABLET ORAL 3 TIMES DAILY
Qty: 270 TABLET | Refills: 3 | Status: SHIPPED | OUTPATIENT
Start: 2024-01-22 | End: 2024-01-22 | Stop reason: SDUPTHER

## 2024-01-22 NOTE — PROGRESS NOTES
Subjective:       Patient ID: Shweta Dela Cruz is a 29 y.o. female.    Chief Complaint: Medication Refill    Visit type: audiovisual    Patient is a 29 y.o. female who traditionally follows with Steffi Garcia MD located in [place] presenting today for medication refills.     Review of patient's allergies indicates:  No Known Allergies    Medication List with Changes/Refills   Current Medications    NORETHINDRONE-E.ESTRADIOL-IRON (LO LOESTRIN FE) 1 MG-10 MCG (24)/10 MCG (2) TAB    Take 1 tablet by mouth once daily.   Changed and/or Refilled Medications    Modified Medication Previous Medication    BUSPIRONE (BUSPAR) 10 MG TABLET busPIRone (BUSPAR) 10 MG tablet       Take 1 tablet (10 mg total) by mouth 3 (three) times daily.    TAKE 1 TABLET(10 MG) BY MOUTH THREE TIMES DAILY    METOPROLOL SUCCINATE (TOPROL-XL) 25 MG 24 HR TABLET metoprolol succinate (TOPROL-XL) 25 MG 24 hr tablet       Take 1 tablet (25 mg total) by mouth once daily.    Take 1 tablet (25 mg total) by mouth once daily.    SERTRALINE (ZOLOFT) 100 MG TABLET sertraline (ZOLOFT) 100 MG tablet       Take 1.5 tablets (150 mg total) by mouth once daily.    Take 1.5 tablets (150 mg total) by mouth once daily.      Objective:   There were no vitals taken for this visit.    Physical Exam  Vitals reviewed: Exam Limited due to Video Consultation.   Constitutional:       General: She is not in acute distress.  HENT:      Head: Normocephalic.   Neurological:      Mental Status: She is alert and oriented to person, place, and time.       Assessment and Plan:     1. AUGUSTUS (generalized anxiety disorder)  2. Depression, unspecified depression type    Chronic, stable, continue current medication. Patient encouraged to seek out PCP.    - busPIRone (BUSPAR) 10 MG tablet; Take 1 tablet (10 mg total) by mouth 3 (three) times daily.  Dispense: 270 tablet; Refill: 0  - sertraline (ZOLOFT) 100 MG tablet; Take 1.5 tablets (150 mg total) by mouth once daily.  Dispense:  135 tablet; Refill: 0    3. Essential hypertension    Chronic, continue current medication. Patient encouraged to seek out PCP.      - metoprolol succinate (TOPROL-XL) 25 MG 24 hr tablet; Take 1 tablet (25 mg total) by mouth once daily.  Dispense: 90 tablet; Refill: 0      Face to Face time with patient: 4  10 minutes of total time spent on the encounter, which includes face to face time and non-face to face time preparing to see the patient (eg, review of tests), Obtaining and/or reviewing separately obtained history, Documenting clinical information in the electronic or other health record, Independently interpreting results (not separately reported) and communicating results to the patient/family/caregiver, or Care coordination (not separately reported).       Each patient to whom he or she provides medical services by telemedicine is:  (1) informed of the relationship between the physician and patient and the respective role of any other health care provider with respect to management of the patient; and (2) notified that he or she may decline to receive medical services by telemedicine and may withdraw from such care at any time.

## 2024-03-13 ENCOUNTER — OFFICE VISIT (OUTPATIENT)
Dept: INTERNAL MEDICINE | Facility: CLINIC | Age: 29
End: 2024-03-13
Payer: MEDICAID

## 2024-03-13 ENCOUNTER — LAB VISIT (OUTPATIENT)
Dept: LAB | Facility: HOSPITAL | Age: 29
End: 2024-03-13
Payer: MEDICAID

## 2024-03-13 VITALS
RESPIRATION RATE: 12 BRPM | TEMPERATURE: 98 F | DIASTOLIC BLOOD PRESSURE: 100 MMHG | SYSTOLIC BLOOD PRESSURE: 124 MMHG | WEIGHT: 128.5 LBS | HEART RATE: 60 BPM | BODY MASS INDEX: 22.77 KG/M2 | OXYGEN SATURATION: 98 % | HEIGHT: 63 IN

## 2024-03-13 DIAGNOSIS — Z00.00 ENCOUNTER FOR ANNUAL HEALTH EXAMINATION: ICD-10-CM

## 2024-03-13 DIAGNOSIS — M54.2 CERVICALGIA: ICD-10-CM

## 2024-03-13 DIAGNOSIS — Z78.9 VEGETARIAN DIET: ICD-10-CM

## 2024-03-13 DIAGNOSIS — F32.A DEPRESSION, UNSPECIFIED DEPRESSION TYPE: ICD-10-CM

## 2024-03-13 DIAGNOSIS — Z00.00 ENCOUNTER FOR ANNUAL HEALTH EXAMINATION: Primary | ICD-10-CM

## 2024-03-13 DIAGNOSIS — I10 ESSENTIAL HYPERTENSION: ICD-10-CM

## 2024-03-13 DIAGNOSIS — R51.9 ACUTE NONINTRACTABLE HEADACHE, UNSPECIFIED HEADACHE TYPE: ICD-10-CM

## 2024-03-13 DIAGNOSIS — F41.1 GAD (GENERALIZED ANXIETY DISORDER): ICD-10-CM

## 2024-03-13 LAB
ALBUMIN SERPL BCP-MCNC: 3.7 G/DL (ref 3.5–5.2)
ALP SERPL-CCNC: 43 U/L (ref 55–135)
ALT SERPL W/O P-5'-P-CCNC: 13 U/L (ref 10–44)
ANION GAP SERPL CALC-SCNC: 6 MMOL/L (ref 8–16)
AST SERPL-CCNC: 17 U/L (ref 10–40)
BASOPHILS # BLD AUTO: 0.03 K/UL (ref 0–0.2)
BASOPHILS NFR BLD: 0.4 % (ref 0–1.9)
BILIRUB SERPL-MCNC: 0.4 MG/DL (ref 0.1–1)
BUN SERPL-MCNC: 9 MG/DL (ref 6–20)
CALCIUM SERPL-MCNC: 9.7 MG/DL (ref 8.7–10.5)
CHLORIDE SERPL-SCNC: 110 MMOL/L (ref 95–110)
CHOLEST SERPL-MCNC: 176 MG/DL (ref 120–199)
CHOLEST/HDLC SERPL: 2.8 {RATIO} (ref 2–5)
CO2 SERPL-SCNC: 24 MMOL/L (ref 23–29)
CREAT SERPL-MCNC: 0.8 MG/DL (ref 0.5–1.4)
DIFFERENTIAL METHOD BLD: NORMAL
EOSINOPHIL # BLD AUTO: 0.2 K/UL (ref 0–0.5)
EOSINOPHIL NFR BLD: 3.2 % (ref 0–8)
ERYTHROCYTE [DISTWIDTH] IN BLOOD BY AUTOMATED COUNT: 13.7 % (ref 11.5–14.5)
EST. GFR  (NO RACE VARIABLE): >60 ML/MIN/1.73 M^2
FOLATE SERPL-MCNC: 13.3 NG/ML (ref 4–24)
GLUCOSE SERPL-MCNC: 90 MG/DL (ref 70–110)
HCT VFR BLD AUTO: 42.6 % (ref 37–48.5)
HDLC SERPL-MCNC: 63 MG/DL (ref 40–75)
HDLC SERPL: 35.8 % (ref 20–50)
HGB BLD-MCNC: 13.7 G/DL (ref 12–16)
IMM GRANULOCYTES # BLD AUTO: 0.03 K/UL (ref 0–0.04)
IMM GRANULOCYTES NFR BLD AUTO: 0.4 % (ref 0–0.5)
IRON SERPL-MCNC: 53 UG/DL (ref 30–160)
LDLC SERPL CALC-MCNC: 98.6 MG/DL (ref 63–159)
LYMPHOCYTES # BLD AUTO: 1.8 K/UL (ref 1–4.8)
LYMPHOCYTES NFR BLD: 24.3 % (ref 18–48)
MCH RBC QN AUTO: 28.5 PG (ref 27–31)
MCHC RBC AUTO-ENTMCNC: 32.2 G/DL (ref 32–36)
MCV RBC AUTO: 89 FL (ref 82–98)
MONOCYTES # BLD AUTO: 0.5 K/UL (ref 0.3–1)
MONOCYTES NFR BLD: 7.1 % (ref 4–15)
NEUTROPHILS # BLD AUTO: 4.9 K/UL (ref 1.8–7.7)
NEUTROPHILS NFR BLD: 64.6 % (ref 38–73)
NONHDLC SERPL-MCNC: 113 MG/DL
NRBC BLD-RTO: 0 /100 WBC
PLATELET # BLD AUTO: 329 K/UL (ref 150–450)
PMV BLD AUTO: 11.6 FL (ref 9.2–12.9)
POTASSIUM SERPL-SCNC: 4.6 MMOL/L (ref 3.5–5.1)
PROT SERPL-MCNC: 7.5 G/DL (ref 6–8.4)
RBC # BLD AUTO: 4.8 M/UL (ref 4–5.4)
SATURATED IRON: 10 % (ref 20–50)
SODIUM SERPL-SCNC: 140 MMOL/L (ref 136–145)
TOTAL IRON BINDING CAPACITY: 534 UG/DL (ref 250–450)
TRANSFERRIN SERPL-MCNC: 361 MG/DL (ref 200–375)
TRIGL SERPL-MCNC: 72 MG/DL (ref 30–150)
TSH SERPL DL<=0.005 MIU/L-ACNC: 2.13 UIU/ML (ref 0.4–4)
VIT B12 SERPL-MCNC: 806 PG/ML (ref 210–950)
WBC # BLD AUTO: 7.57 K/UL (ref 3.9–12.7)

## 2024-03-13 PROCEDURE — 3008F BODY MASS INDEX DOCD: CPT | Mod: CPTII,,, | Performed by: NURSE PRACTITIONER

## 2024-03-13 PROCEDURE — 83540 ASSAY OF IRON: CPT | Performed by: NURSE PRACTITIONER

## 2024-03-13 PROCEDURE — 3074F SYST BP LT 130 MM HG: CPT | Mod: CPTII,,, | Performed by: NURSE PRACTITIONER

## 2024-03-13 PROCEDURE — 3080F DIAST BP >= 90 MM HG: CPT | Mod: CPTII,,, | Performed by: NURSE PRACTITIONER

## 2024-03-13 PROCEDURE — 80053 COMPREHEN METABOLIC PANEL: CPT | Performed by: NURSE PRACTITIONER

## 2024-03-13 PROCEDURE — 99999 PR PBB SHADOW E&M-EST. PATIENT-LVL IV: CPT | Mod: PBBFAC,,, | Performed by: NURSE PRACTITIONER

## 2024-03-13 PROCEDURE — 1160F RVW MEDS BY RX/DR IN RCRD: CPT | Mod: CPTII,,, | Performed by: NURSE PRACTITIONER

## 2024-03-13 PROCEDURE — 85025 COMPLETE CBC W/AUTO DIFF WBC: CPT | Performed by: NURSE PRACTITIONER

## 2024-03-13 PROCEDURE — 99214 OFFICE O/P EST MOD 30 MIN: CPT | Mod: PBBFAC,PO | Performed by: NURSE PRACTITIONER

## 2024-03-13 PROCEDURE — 82607 VITAMIN B-12: CPT | Performed by: NURSE PRACTITIONER

## 2024-03-13 PROCEDURE — 80061 LIPID PANEL: CPT | Performed by: NURSE PRACTITIONER

## 2024-03-13 PROCEDURE — 84443 ASSAY THYROID STIM HORMONE: CPT | Performed by: NURSE PRACTITIONER

## 2024-03-13 PROCEDURE — 36415 COLL VENOUS BLD VENIPUNCTURE: CPT | Mod: PO | Performed by: NURSE PRACTITIONER

## 2024-03-13 PROCEDURE — 82746 ASSAY OF FOLIC ACID SERUM: CPT | Performed by: NURSE PRACTITIONER

## 2024-03-13 PROCEDURE — 99395 PREV VISIT EST AGE 18-39: CPT | Mod: S$PBB,,, | Performed by: NURSE PRACTITIONER

## 2024-03-13 PROCEDURE — 1159F MED LIST DOCD IN RCRD: CPT | Mod: CPTII,,, | Performed by: NURSE PRACTITIONER

## 2024-03-13 RX ORDER — BUSPIRONE HYDROCHLORIDE 10 MG/1
10 TABLET ORAL 3 TIMES DAILY
Qty: 270 TABLET | Refills: 3 | Status: SHIPPED | OUTPATIENT
Start: 2024-03-13

## 2024-03-13 RX ORDER — METOPROLOL SUCCINATE 25 MG/1
25 TABLET, EXTENDED RELEASE ORAL DAILY
Qty: 90 TABLET | Refills: 3 | Status: SHIPPED | OUTPATIENT
Start: 2024-03-13 | End: 2024-05-20

## 2024-03-13 RX ORDER — SERTRALINE HYDROCHLORIDE 100 MG/1
150 TABLET, FILM COATED ORAL DAILY
Qty: 135 TABLET | Refills: 3 | Status: SHIPPED | OUTPATIENT
Start: 2024-03-13 | End: 2024-05-20

## 2024-03-13 RX ORDER — PREDNISONE 20 MG/1
40 TABLET ORAL DAILY
Qty: 10 TABLET | Refills: 0 | Status: SHIPPED | OUTPATIENT
Start: 2024-03-13 | End: 2024-03-18

## 2024-03-13 RX ORDER — TIZANIDINE 2 MG/1
2 TABLET ORAL EVERY 8 HOURS PRN
Qty: 15 TABLET | Refills: 0 | Status: SHIPPED | OUTPATIENT
Start: 2024-03-13 | End: 2024-03-18

## 2024-05-18 DIAGNOSIS — I10 ESSENTIAL HYPERTENSION: ICD-10-CM

## 2024-05-19 DIAGNOSIS — F32.A DEPRESSION, UNSPECIFIED DEPRESSION TYPE: ICD-10-CM

## 2024-05-20 RX ORDER — METOPROLOL SUCCINATE 25 MG/1
25 TABLET, EXTENDED RELEASE ORAL
Qty: 30 TABLET | Refills: 0 | Status: SHIPPED | OUTPATIENT
Start: 2024-05-20

## 2024-05-20 RX ORDER — SERTRALINE HYDROCHLORIDE 100 MG/1
TABLET, FILM COATED ORAL
Qty: 135 TABLET | Refills: 3 | Status: SHIPPED | OUTPATIENT
Start: 2024-05-20

## 2024-07-20 DIAGNOSIS — F32.81 PMDD (PREMENSTRUAL DYSPHORIC DISORDER): ICD-10-CM

## 2024-07-20 RX ORDER — NORETHINDRONE ACETATE AND ETHINYL ESTRADIOL, ETHINYL ESTRADIOL AND FERROUS FUMARATE 1MG-10(24)
1 KIT ORAL
Qty: 84 TABLET | Refills: 0 | Status: SHIPPED | OUTPATIENT
Start: 2024-07-20 | End: 2024-07-24 | Stop reason: SDUPTHER

## 2024-07-21 NOTE — TELEPHONE ENCOUNTER
Refill Decision Note   Shweta Lonnie Dela Cruz  is requesting a refill authorization.  Brief Assessment and Rationale for Refill:  Approve     Medication Therapy Plan:        Comments:     Note composed:7:28 PM 07/20/2024

## 2024-07-23 ENCOUNTER — PATIENT MESSAGE (OUTPATIENT)
Dept: OBSTETRICS AND GYNECOLOGY | Facility: CLINIC | Age: 29
End: 2024-07-23
Payer: MEDICAID

## 2024-07-23 DIAGNOSIS — F32.81 PMDD (PREMENSTRUAL DYSPHORIC DISORDER): ICD-10-CM

## 2024-07-23 RX ORDER — NORETHINDRONE ACETATE AND ETHINYL ESTRADIOL, ETHINYL ESTRADIOL AND FERROUS FUMARATE 1MG-10(24)
1 KIT ORAL
Qty: 84 TABLET | Refills: 0 | OUTPATIENT
Start: 2024-07-23

## 2024-07-23 NOTE — TELEPHONE ENCOUNTER
Refill Decision Note  Quick DC. Request already responded to by other means (e.g. phone or fax)      Shweta Lonnie Dela Cruz  is requesting a refill authorization.  Brief Assessment and Rationale for Refill:  Quick Discontinue     Medication Therapy Plan:  : Receipt confirmed by pharmacy (7/20/2024  7:28 PM CDT) nusrat      Comments:     Note composed:12:54 PM 07/23/2024

## 2024-07-24 RX ORDER — NORETHINDRONE ACETATE AND ETHINYL ESTRADIOL, ETHINYL ESTRADIOL AND FERROUS FUMARATE 1MG-10(24)
1 KIT ORAL DAILY
Qty: 84 TABLET | Refills: 0 | Status: SHIPPED | OUTPATIENT
Start: 2024-07-24

## 2024-08-19 DIAGNOSIS — I10 ESSENTIAL HYPERTENSION: ICD-10-CM

## 2024-08-20 RX ORDER — METOPROLOL SUCCINATE 25 MG/1
25 TABLET, EXTENDED RELEASE ORAL
Qty: 90 TABLET | Refills: 0 | Status: SHIPPED | OUTPATIENT
Start: 2024-08-20

## 2024-08-20 NOTE — TELEPHONE ENCOUNTER
I spoke to the patient and informed her that her Rx has been refilled for 90 days.  She was also informed to establish care with a PCP for future refills.

## 2024-08-23 DIAGNOSIS — F32.81 PMDD (PREMENSTRUAL DYSPHORIC DISORDER): ICD-10-CM

## 2024-08-23 RX ORDER — NORETHINDRONE ACETATE AND ETHINYL ESTRADIOL, ETHINYL ESTRADIOL AND FERROUS FUMARATE 1MG-10(24)
1 KIT ORAL DAILY
Qty: 84 TABLET | Refills: 0 | Status: SHIPPED | OUTPATIENT
Start: 2024-08-23

## 2024-08-23 NOTE — TELEPHONE ENCOUNTER
Refill Decision Note   Shweta Lonnie Dela Cruz  is requesting a refill authorization.  Brief Assessment and Rationale for Refill:  Approve     Medication Therapy Plan:         Comments:     Note composed:2:00 PM 08/23/2024

## 2024-08-29 DIAGNOSIS — F32.A DEPRESSION, UNSPECIFIED DEPRESSION TYPE: ICD-10-CM

## 2024-08-29 RX ORDER — SERTRALINE HYDROCHLORIDE 100 MG/1
TABLET, FILM COATED ORAL
Qty: 90 TABLET | Refills: 1 | Status: SHIPPED | OUTPATIENT
Start: 2024-08-29

## 2024-09-09 ENCOUNTER — PATIENT MESSAGE (OUTPATIENT)
Dept: OBSTETRICS AND GYNECOLOGY | Facility: CLINIC | Age: 29
End: 2024-09-09

## 2024-09-09 ENCOUNTER — OFFICE VISIT (OUTPATIENT)
Dept: OBSTETRICS AND GYNECOLOGY | Facility: CLINIC | Age: 29
End: 2024-09-09
Payer: MEDICAID

## 2024-09-09 VITALS
SYSTOLIC BLOOD PRESSURE: 111 MMHG | BODY MASS INDEX: 21.89 KG/M2 | WEIGHT: 123.63 LBS | DIASTOLIC BLOOD PRESSURE: 77 MMHG

## 2024-09-09 DIAGNOSIS — F32.81 PMDD (PREMENSTRUAL DYSPHORIC DISORDER): ICD-10-CM

## 2024-09-09 PROCEDURE — 3008F BODY MASS INDEX DOCD: CPT | Mod: CPTII,,, | Performed by: STUDENT IN AN ORGANIZED HEALTH CARE EDUCATION/TRAINING PROGRAM

## 2024-09-09 PROCEDURE — 99999 PR PBB SHADOW E&M-EST. PATIENT-LVL II: CPT | Mod: PBBFAC,,, | Performed by: STUDENT IN AN ORGANIZED HEALTH CARE EDUCATION/TRAINING PROGRAM

## 2024-09-09 PROCEDURE — 3074F SYST BP LT 130 MM HG: CPT | Mod: CPTII,,, | Performed by: STUDENT IN AN ORGANIZED HEALTH CARE EDUCATION/TRAINING PROGRAM

## 2024-09-09 PROCEDURE — 3078F DIAST BP <80 MM HG: CPT | Mod: CPTII,,, | Performed by: STUDENT IN AN ORGANIZED HEALTH CARE EDUCATION/TRAINING PROGRAM

## 2024-09-09 PROCEDURE — 99212 OFFICE O/P EST SF 10 MIN: CPT | Mod: PBBFAC,PO | Performed by: STUDENT IN AN ORGANIZED HEALTH CARE EDUCATION/TRAINING PROGRAM

## 2024-09-09 PROCEDURE — 99395 PREV VISIT EST AGE 18-39: CPT | Mod: S$PBB,,, | Performed by: STUDENT IN AN ORGANIZED HEALTH CARE EDUCATION/TRAINING PROGRAM

## 2024-09-09 PROCEDURE — 1159F MED LIST DOCD IN RCRD: CPT | Mod: CPTII,,, | Performed by: STUDENT IN AN ORGANIZED HEALTH CARE EDUCATION/TRAINING PROGRAM

## 2024-09-09 RX ORDER — NORETHINDRONE ACETATE AND ETHINYL ESTRADIOL, ETHINYL ESTRADIOL AND FERROUS FUMARATE 1MG-10(24)
1 KIT ORAL DAILY
Qty: 84 TABLET | Refills: 5 | Status: SHIPPED | OUTPATIENT
Start: 2024-09-09

## 2024-09-09 NOTE — PROGRESS NOTES
CC: Well woman exam    HPI:  Shweta Dela Cruz is a 29 y.o. female  presents for a well woman exam.  She has been doing really well on the Lo loestrin, working well for bleeding management and PMDD. She is taking the hormone pills continuously to prevent any heavy periods and manage her PMDD better.       Patient history:   Past Medical History:   Diagnosis Date    Anxiety     Depression     Hypertension      Past Surgical History:   Procedure Laterality Date    ASD REPAIR       OB History    Para Term  AB Living   0 0 0 0 0 0   SAB IAB Ectopic Multiple Live Births   0 0 0 0 0       GYN  Menopausal: No  History of abnormal paps: DENIES  Abnormal or postmenopausal bleeding:  menorrhagia  History of abnormal mammograms:N/A   Family history of breast or ovarian cancer: DENIES  Any breast masses, pain, skin changes, or nipple discharge: DENIES  Possible recent STD exposure: denies  Contraception: OCPs     Pap: 2023, NILM  Mammogram: N/A      No family history on file.  Social History     Tobacco Use    Smoking status: Never    Smokeless tobacco: Never   Substance Use Topics    Alcohol use: Yes     Alcohol/week: 1.0 standard drink of alcohol     Types: 1 Glasses of wine per week     Comment: rarely    Drug use: Never     Allergies: Patient has no known allergies.    Current Outpatient Medications:     busPIRone (BUSPAR) 10 MG tablet, Take 1 tablet (10 mg total) by mouth 3 (three) times daily., Disp: 270 tablet, Rfl: 3    metoprolol succinate (TOPROL-XL) 25 MG 24 hr tablet, TAKE 1 TABLET(25 MG) BY MOUTH EVERY DAY, Disp: 90 tablet, Rfl: 0    sertraline (ZOLOFT) 100 MG tablet, TAKE 1 AND 1/2 TABLETS(150 MG) BY MOUTH EVERY DAY, Disp: 90 tablet, Rfl: 1    norethindrone-e.estradioL-iron (LO LOESTRIN FE) 1 mg-10 mcg (24)/10 mcg (2) Tab, Take 1 tablet by mouth once daily. Take hormonal pills continuously, skip placebo pills, Disp: 84 tablet, Rfl: 5       ROS:  GENERAL: Denies weight gain or weight  loss. Feeling well overall.   SKIN: Denies rash or lesions.   HEAD: Denies head injury or headache.   NODES: Denies enlarged lymph nodes.   CHEST: Denies chest pain or shortness of breath.   CARDIOVASCULAR: Denies palpitations or left sided chest pain.   ABDOMEN: No abdominal pain, constipation, diarrhea, nausea, vomiting or rectal bleeding.   URINARY: No frequency, dysuria, hematuria, or burning on urination.  REPRODUCTIVE: See HPI.   BREASTS: The patient performs breast self-examination and denies pain, lumps, or nipple discharge.   HEMATOLOGIC: No easy bruisability or excessive bleeding.  MUSCULOSKELETAL: Denies joint pain or swelling.   NEUROLOGIC: Denies syncope or weakness.   PSYCHIATRIC: Denies depression, anxiety or mood swings.    Objective:   /77   Wt 56.1 kg (123 lb 9.6 oz)   LMP 2024 (Exact Date)   BMI 21.89 kg/m²       Physical Exam:  APPEARANCE: Well nourished, well developed, in no acute distress.  AFFECT: WNL, alert and oriented x 3  SKIN: No acne or hirsutism  NECK: Neck symmetric without masses or thyromegaly  CHEST: Good respiratory effect  BREASTS: deferred no complaints today   PELVIC: deferred no complaints today   EXTREMITIES: No edema.    ASSESSMENT AND PLAN  1. PMDD (premenstrual dysphoric disorder)  norethindrone-e.estradioL-iron (LO LOESTRIN FE) 1 mg-10 mcg (24)/10 mcg (2) Tab          Annual exam  Breast and pelvic exam: deferred   Patient counseled on ASCCP guidelines for cervical cytology screening  Cervical screenin NILM, due in   Patient counseled on current recommendations for breast cancer screening  Mammogram screening: at 40  STD testing: declined/not needed today   Contraception: continue Lo loestrin rx refilled for continuous use   Osteoporosis screening at 65  Tobacco cessation counseling n/a    She was counseled to follow up with her PCP for other routine health maintenance      Follow up in about 1 year (around 2025).      Steffi Garcia,  MD OBGYN [facility]

## 2024-10-04 ENCOUNTER — OFFICE VISIT (OUTPATIENT)
Dept: PRIMARY CARE CLINIC | Facility: CLINIC | Age: 29
End: 2024-10-04
Payer: MEDICAID

## 2024-10-04 ENCOUNTER — PATIENT OUTREACH (OUTPATIENT)
Dept: ADMINISTRATIVE | Facility: OTHER | Age: 29
End: 2024-10-04
Payer: MEDICAID

## 2024-10-04 VITALS
HEIGHT: 63 IN | WEIGHT: 123.38 LBS | OXYGEN SATURATION: 100 % | DIASTOLIC BLOOD PRESSURE: 83 MMHG | SYSTOLIC BLOOD PRESSURE: 120 MMHG | HEART RATE: 62 BPM | TEMPERATURE: 98 F | BODY MASS INDEX: 21.86 KG/M2 | RESPIRATION RATE: 18 BRPM

## 2024-10-04 DIAGNOSIS — Z13.1 SCREENING FOR DIABETES MELLITUS: ICD-10-CM

## 2024-10-04 DIAGNOSIS — Z11.4 SCREENING FOR HIV (HUMAN IMMUNODEFICIENCY VIRUS): ICD-10-CM

## 2024-10-04 DIAGNOSIS — Z00.00 ENCOUNTER FOR WELLNESS EXAMINATION IN ADULT: Primary | ICD-10-CM

## 2024-10-04 DIAGNOSIS — Z01.00 ROUTINE EYE EXAM: ICD-10-CM

## 2024-10-04 DIAGNOSIS — I10 ESSENTIAL HYPERTENSION: ICD-10-CM

## 2024-10-04 DIAGNOSIS — F32.A DEPRESSION, UNSPECIFIED DEPRESSION TYPE: ICD-10-CM

## 2024-10-04 DIAGNOSIS — Z11.59 ENCOUNTER FOR HEPATITIS C SCREENING TEST FOR LOW RISK PATIENT: ICD-10-CM

## 2024-10-04 DIAGNOSIS — Z78.9 PESCETARIAN: ICD-10-CM

## 2024-10-04 DIAGNOSIS — F41.1 GAD (GENERALIZED ANXIETY DISORDER): ICD-10-CM

## 2024-10-04 PROCEDURE — 99215 OFFICE O/P EST HI 40 MIN: CPT | Mod: PBBFAC,PN | Performed by: NURSE PRACTITIONER

## 2024-10-04 PROCEDURE — 99999 PR PBB SHADOW E&M-EST. PATIENT-LVL V: CPT | Mod: PBBFAC,,, | Performed by: NURSE PRACTITIONER

## 2024-10-04 NOTE — PROGRESS NOTES
Subjective:       Patient ID: Shweta Dela Cruz is a 29 y.o. female.    Chief Complaint: establish care    MsShila Dela Cruz is a 29 year old female, new to me, presents to the clinic for wellness examination and establish care. No PCP. Medical and surgical history in addition to problem list reviewed as listed below.     LMP-09/22/2024, , oral contraception management, not sexually active, seen by Gynecology a month ago, Pap smear this year.      Last eye examination-more than 10 years    Pescatarian, exercises 5 days a week.    Dance and ballerina teacher.    Resides with parents.    Anxiety and depression-managed by Svitlana Carter, [facility], telehealth.    HTN-diagnosed at age 19, denies headache, dizziness,blurry/double, lightheadedness/fainting, heart palpitations, nosebleeds, shortness of breath, nausea/vomiting.Occasionally monitors blood pressure.      Past Medical History:   Diagnosis Date    Anxiety     Depression     Hypertension         Past Surgical History:   Procedure Laterality Date    ASD REPAIR  2003        No family history on file.    Social History     Tobacco Use   Smoking Status Never   Smokeless Tobacco Never       Social History     Social History Narrative    Not on file       Review of patient's allergies indicates:  No Known Allergies     Review of Systems   Constitutional:  Negative for fatigue and fever.   Respiratory:  Negative for chest tightness and shortness of breath.    Cardiovascular:  Negative for chest pain and palpitations.   Gastrointestinal:  Negative for nausea and vomiting.   Genitourinary:  Negative for dysuria, frequency, hematuria, vaginal bleeding, vaginal discharge and vaginal pain.   Neurological:  Negative for dizziness, light-headedness and headaches.   Psychiatric/Behavioral:  The patient is nervous/anxious.          Objective:      Vitals:    10/04/24 1121   BP: 120/83   BP Location: Right arm   Patient Position: Sitting   Pulse: 62   Resp: 18   Temp: 98.4 °F  "(36.9 °C)   TempSrc: Oral   SpO2: 100%   Weight: 56 kg (123 lb 5.6 oz)   Height: 5' 3" (1.6 m)      Physical Exam  Constitutional:       General: She is not in acute distress.     Appearance: She is well-developed.   HENT:      Head: Normocephalic and atraumatic.      Right Ear: External ear normal.      Left Ear: External ear normal.   Eyes:      General: No scleral icterus.     Extraocular Movements: Extraocular movements intact.      Conjunctiva/sclera: Conjunctivae normal.   Cardiovascular:      Rate and Rhythm: Normal rate and regular rhythm.      Heart sounds: Normal heart sounds. No murmur heard.     No friction rub. No gallop.   Pulmonary:      Effort: Pulmonary effort is normal.      Breath sounds: Normal breath sounds. No wheezing or rales.   Musculoskeletal:         General: Normal range of motion.      Cervical back: Normal range of motion and neck supple.   Lymphadenopathy:      Cervical: No cervical adenopathy.   Skin:     General: Skin is warm and dry.      Findings: No erythema or rash.   Neurological:      Mental Status: She is alert and oriented to person, place, and time.      Cranial Nerves: No cranial nerve deficit.   Psychiatric:         Mood and Affect: Mood normal.         Behavior: Behavior normal.         Assessment:       1. Encounter for wellness examination in adult    2. Encounter for medication refill    3. AUGUSTUS (generalized anxiety disorder)    4. Depression, unspecified depression type    5. Essential hypertension    6. Routine eye exam    7. Pescetarian    8. Encounter for hepatitis C screening test for low risk patient    9. Screening for HIV (human immunodeficiency virus)    10. Screening for diabetes mellitus    11. Anxiety and depression        Plan:       Encounter for wellness examination in adult  Counseled patient on importance of health prevention screening, immunizations, and overall wellness.   Immunizations reviewed.    -     Vitamin D; Future; Expected date: " 10/04/2024  -     Urinalysis, Reflex to Urine Culture Urine, Clean Catch; Future; Expected date: 10/04/2024    AUGUSTUS (generalized anxiety disorder)  Stable, continuing current management.       Depression, unspecified depression type  Stable, continuing current management.       Essential hypertension  Stable, continuing current management.    Encouraged to monitor and record blood pressure on a regular basis,     Comments:  diagnosed at age 19    Routine eye exam  -     Ambulatory referral/consult to Optometry; Future; Expected date: 10/04/2024    Pescetarian    Encounter for hepatitis C screening test for low risk patient  -     Hepatitis C Antibody; Future; Expected date: 10/04/2024    Screening for HIV (human immunodeficiency virus)  -     HIV 1/2 Ag/Ab (4th Gen); Future; Expected date: 10/04/2024    Screening for diabetes mellitus  -     Hemoglobin A1C; Future; Expected date: 10/04/2024      Health maintenance review/updated.     Refuses vaccination.    Plans to complete labs today.    Return to clinic in 1 month establish care with Dr. Degroot.     Medication List with Changes/Refills   Current Medications    BUSPIRONE (BUSPAR) 10 MG TABLET    Take 1 tablet (10 mg total) by mouth 3 (three) times daily.    METOPROLOL SUCCINATE (TOPROL-XL) 25 MG 24 HR TABLET    TAKE 1 TABLET(25 MG) BY MOUTH EVERY DAY    NORETHINDRONE-E.ESTRADIOL-IRON (LO LOESTRIN FE) 1 MG-10 MCG (24)/10 MCG (2) TAB    Take 1 tablet by mouth once daily. Take hormonal pills continuously, skip placebo pills    SERTRALINE (ZOLOFT) 100 MG TABLET    TAKE 1 AND 1/2 TABLETS(150 MG) BY MOUTH EVERY DAY        Follow up in about 1 month (around 11/4/2024) for Dr. Nathaniel Degroot.    I spent a total of 30 minutes on the day of the visit.This includes face to face time and non-face to face time preparing to see the patient (eg, review of tests), obtaining and/or reviewing separately obtained history, documenting clinical information in the electronic or  other health record, independently interpreting results and communicating results to the patient/family/caregiver, or care coordinator.     Lanny Hameed APRN, MSN, FNP-C

## 2024-10-04 NOTE — PROGRESS NOTES
CHW - Outreach Attempt    Community Health Worker left a voicemail message for 1st attempt to contact patient regarding: SDOH completion and assessment    Community Health Worker to attempt to contact patient on: 10/4/24

## 2024-10-04 NOTE — PATIENT INSTRUCTIONS
You can call any of the following numbers to schedule your referral, if you could not get scheduled today: [phone] or [phone].       Please get your labs done at any [facility] facility that has a laboratory, you do not need an appointment.     I will communicate your laboratory and/or imaging results with you through your [email or web address] account that was set up through the portal, it was a pleasure meeting and taking care of you today.

## 2024-11-05 ENCOUNTER — PATIENT OUTREACH (OUTPATIENT)
Dept: ADMINISTRATIVE | Facility: OTHER | Age: 29
End: 2024-11-05
Payer: MEDICAID

## 2024-11-05 ENCOUNTER — OFFICE VISIT (OUTPATIENT)
Dept: PRIMARY CARE CLINIC | Facility: CLINIC | Age: 29
End: 2024-11-05
Payer: MEDICAID

## 2024-11-05 VITALS
BODY MASS INDEX: 21.74 KG/M2 | OXYGEN SATURATION: 97 % | SYSTOLIC BLOOD PRESSURE: 123 MMHG | RESPIRATION RATE: 18 BRPM | TEMPERATURE: 98 F | HEIGHT: 63 IN | WEIGHT: 122.69 LBS | DIASTOLIC BLOOD PRESSURE: 82 MMHG | HEART RATE: 63 BPM

## 2024-11-05 DIAGNOSIS — I10 ESSENTIAL HYPERTENSION: Primary | ICD-10-CM

## 2024-11-05 PROCEDURE — 1159F MED LIST DOCD IN RCRD: CPT | Mod: CPTII,,,

## 2024-11-05 PROCEDURE — 3079F DIAST BP 80-89 MM HG: CPT | Mod: CPTII,,,

## 2024-11-05 PROCEDURE — 3074F SYST BP LT 130 MM HG: CPT | Mod: CPTII,,,

## 2024-11-05 PROCEDURE — 1160F RVW MEDS BY RX/DR IN RCRD: CPT | Mod: CPTII,,,

## 2024-11-05 PROCEDURE — 99999 PR PBB SHADOW E&M-EST. PATIENT-LVL III: CPT | Mod: PBBFAC,,,

## 2024-11-05 PROCEDURE — 99213 OFFICE O/P EST LOW 20 MIN: CPT | Mod: PBBFAC,PN

## 2024-11-05 PROCEDURE — 3008F BODY MASS INDEX DOCD: CPT | Mod: CPTII,,,

## 2024-11-05 PROCEDURE — 99213 OFFICE O/P EST LOW 20 MIN: CPT | Mod: S$PBB,,,

## 2024-11-05 NOTE — PROGRESS NOTES
CHW - Outreach Attempt    Community Health Worker left a voicemail message for 2nd attempt to contact patient regarding: SDOH completion and assessment.   Community Health Worker to attempt to contact patient on: 11/5/24

## 2024-11-05 NOTE — PROGRESS NOTES
CHW - Initial Contact    This Community Health Worker updated the Social Determinant of Health questionnaire with patient via telephone today.    Pt identified barriers of most importance are: No barriers reported.   Referrals to community agencies completed with patient/caregiver consent outside of [facility] include: No.  Referrals were put through [facility] - no:   Support and Services: No.  Other information discussed the patient needs / wants help with: SDOH updated. Patient did not report any barriers at this time, case will be closed.    Follow up required:No.

## 2024-11-05 NOTE — PROGRESS NOTES
"Subjective:       Patient ID: Shweta Dela Cruz is a 29 y.o. female.    Chief Complaint: Establish Care    HPI  Shweta Dela Cruz is a 29 y.o. year old female  who comes for labwork follow up and to establish care.  Patient with no concerns today.  Has history of htn , was seen by cardiology who did secondary htn eval, per pt everything came back normal including kidney US.  Also hx of AUGUSTUS, taking buspar and zoloft, well controlled, managed by online telemed psych.     PMH: htn (age 19),   PSH: ASD repair (2003)  Meds: metoprolol, OCP, sertraline, buspirone  NKDA  Social hx: denies E/T/D Works as  and dancer, varied diet, exercises regularly  Family hx: father: a-fib,     ROS negative except as above  Objective:      /82   Pulse 63   Temp 98.2 °F (36.8 °C) (Oral)   Resp 18   Ht 5' 3" (1.6 m)   Wt 55.7 kg (122 lb 11 oz)   LMP 10/05/2024   SpO2 97%   BMI 21.73 kg/m²    Physical Exam  Vitals reviewed.   Constitutional:       Appearance: Normal appearance.   HENT:      Head: Normocephalic.      Mouth/Throat:      Mouth: Mucous membranes are moist.   Cardiovascular:      Rate and Rhythm: Normal rate and regular rhythm.      Pulses: Normal pulses.      Heart sounds: Normal heart sounds.   Pulmonary:      Effort: Pulmonary effort is normal.      Breath sounds: Normal breath sounds. No wheezing or rhonchi.   Abdominal:      General: Abdomen is flat. There is no distension.   Musculoskeletal:         General: No swelling. Normal range of motion.      Cervical back: Normal range of motion.   Skin:     General: Skin is warm.      Coloration: Skin is not jaundiced.   Neurological:      Mental Status: She is alert.         Assessment:       1. Essential hypertension        Plan:       1. Essential hypertension (Primary)  Well controlled on metoprolol 25 mg  Was been followed by cardiology ho did secondary htn eval, per pt everything came back normal including kidney US.          Follow up in about 1 " year (around 11/5/2025) for annual wellness eval .      Nathaniel Degroot M.D.

## 2024-11-19 ENCOUNTER — TELEPHONE (OUTPATIENT)
Dept: INTERNAL MEDICINE | Facility: CLINIC | Age: 29
End: 2024-11-19
Payer: MEDICAID

## 2024-11-19 NOTE — TELEPHONE ENCOUNTER
My chart message sent to the patient to call and schedule an appointment to get established with a PCP.

## 2025-02-16 DIAGNOSIS — F41.1 GAD (GENERALIZED ANXIETY DISORDER): ICD-10-CM

## 2025-02-17 RX ORDER — BUSPIRONE HYDROCHLORIDE 10 MG/1
10 TABLET ORAL 3 TIMES DAILY
Qty: 270 TABLET | Refills: 3 | Status: SHIPPED | OUTPATIENT
Start: 2025-02-17

## 2025-02-17 NOTE — TELEPHONE ENCOUNTER
Refill Routing Note   Medication(s) are not appropriate for processing by [facility] for the following reason(s):        Outside of protocol    [facility] action(s):  Route      Medication Therapy Plan: Last ordered: 3/13/24 by Sanjuanita Dick NP.      Appointments  past 12m or future 3m with PCP    Date Provider   Last Visit   9/9/2024 Steffi Garcia MD   Next Visit   Visit date not found Steffi Garcia MD   ED visits in past 90 days: 0        Note composed:8:13 AM 02/17/2025

## 2025-03-24 ENCOUNTER — OFFICE VISIT (OUTPATIENT)
Dept: PRIMARY CARE CLINIC | Facility: CLINIC | Age: 30
End: 2025-03-24
Payer: MEDICAID

## 2025-03-24 VITALS
WEIGHT: 123.69 LBS | BODY MASS INDEX: 21.91 KG/M2 | OXYGEN SATURATION: 100 % | TEMPERATURE: 99 F | SYSTOLIC BLOOD PRESSURE: 120 MMHG | DIASTOLIC BLOOD PRESSURE: 78 MMHG | HEART RATE: 70 BPM

## 2025-03-24 DIAGNOSIS — Z11.1 SCREENING-PULMONARY TB: ICD-10-CM

## 2025-03-24 DIAGNOSIS — Z11.59 ENCOUNTER FOR HEPATITIS C SCREENING TEST FOR LOW RISK PATIENT: ICD-10-CM

## 2025-03-24 DIAGNOSIS — Z11.4 ENCOUNTER FOR SCREENING FOR HIV: ICD-10-CM

## 2025-03-24 DIAGNOSIS — Z00.00 ENCOUNTER FOR ANNUAL WELLNESS VISIT: Primary | ICD-10-CM

## 2025-03-24 PROCEDURE — 99999 PR PBB SHADOW E&M-EST. PATIENT-LVL III: CPT | Mod: PBBFAC,,,

## 2025-03-24 PROCEDURE — 99213 OFFICE O/P EST LOW 20 MIN: CPT | Mod: PBBFAC,PN

## 2025-03-24 NOTE — PROGRESS NOTES
Subjective:       Patient ID: Shweta Dela Cruz is a 30 y.o. female.    Chief Complaint: Annual Exam     HPI  Shweta Dela Cruz is a 30 y.o. year old female  who comes to clinic for annual exam    Patient comes to clinic for annual exam.  No complaints today. She has history of hypertension, managed with metoprolol 25 mg daily. Blood pressure today is 120/78.  Also history of depression, managed with Zoloft 100 mg and BuSpar 10 mg, patient has been stable on this dose.    Patient is moving to [place] on the summer for school.  She needs school form signed, specifically regarding immunizations and TB.  MMR and varicella are not in the system.  She has not done in [place].  She will get proof of immunizations    ROS negative except as above  Objective:      /78 (BP Location: Right arm, Patient Position: Sitting)   Pulse 70   Temp 98.5 °F (36.9 °C) (Oral)   Wt 56.1 kg (123 lb 10.9 oz)   LMP  (LMP Unknown)   SpO2 100%   BMI 21.91 kg/m²    Physical Exam    Assessment:       1. Encounter for annual wellness visit    2. Encounter for screening for HIV    3. Encounter for hepatitis C screening test for low risk patient    4. Screening-pulmonary TB        Plan:       1. Encounter for annual wellness visit (Primary)  - Discussed age and gender appropriate screenings at this visit and encouraged a healthy diet low in simple carbohydrates, and increased physical activity.  Counseled on medically appropriate vaccines based on age and current health condition.  Screening test reviewed and discussed with patient.     - CBC Auto Differential; Future  - Comprehensive Metabolic Panel; Future  - Hemoglobin A1C; Future  - Lipid Panel; Future    2. Encounter for screening for HIV  Due, ordered  - HIV 1/2 Ag/Ab (4th Gen); Future    3. Encounter for hepatitis C screening test for low risk patient  Due, ordered  - Hepatitis C Antibody; Future    4. Screening-pulmonary TB  Patient needs TB screening due to her moving to [place]  for school.  - Quantiferon Gold TB; Future          Follow up in about 1 year (around 3/24/2026) for annual wellness visit.  Patient is moving to [place] on the summer for school.  She needs school form signed, specifically regarding immunizations and TB.  MMR and varicella are not in the system.  She has not done in [place].  She will get proof of immunizations and bring it to clinic and will fill it.      Nathaniel Degroot M.D.

## 2025-04-01 ENCOUNTER — RESULTS FOLLOW-UP (OUTPATIENT)
Dept: PRIMARY CARE CLINIC | Facility: CLINIC | Age: 30
End: 2025-04-01

## 2025-04-05 DIAGNOSIS — I10 ESSENTIAL HYPERTENSION: ICD-10-CM

## 2025-04-07 RX ORDER — METOPROLOL SUCCINATE 25 MG/1
25 TABLET, EXTENDED RELEASE ORAL DAILY
Qty: 90 TABLET | Refills: 3 | Status: SHIPPED | OUTPATIENT
Start: 2025-04-07 | End: 2026-04-07

## 2025-04-08 DIAGNOSIS — I10 ESSENTIAL HYPERTENSION: ICD-10-CM

## 2025-04-08 RX ORDER — METOPROLOL SUCCINATE 25 MG/1
25 TABLET, EXTENDED RELEASE ORAL DAILY
Qty: 90 TABLET | Refills: 3 | OUTPATIENT
Start: 2025-04-08 | End: 2026-04-08

## 2025-05-13 ENCOUNTER — OFFICE VISIT (OUTPATIENT)
Dept: PRIMARY CARE CLINIC | Facility: CLINIC | Age: 30
End: 2025-05-13
Payer: MEDICAID

## 2025-05-13 VITALS
TEMPERATURE: 98 F | BODY MASS INDEX: 22.11 KG/M2 | SYSTOLIC BLOOD PRESSURE: 128 MMHG | DIASTOLIC BLOOD PRESSURE: 88 MMHG | HEART RATE: 61 BPM | OXYGEN SATURATION: 99 % | HEIGHT: 63 IN | WEIGHT: 124.75 LBS

## 2025-05-13 DIAGNOSIS — M25.512 ACUTE PAIN OF LEFT SHOULDER: Primary | ICD-10-CM

## 2025-05-13 PROCEDURE — 3044F HG A1C LEVEL LT 7.0%: CPT | Mod: CPTII,,,

## 2025-05-13 PROCEDURE — 3079F DIAST BP 80-89 MM HG: CPT | Mod: CPTII,,,

## 2025-05-13 PROCEDURE — 1160F RVW MEDS BY RX/DR IN RCRD: CPT | Mod: CPTII,,,

## 2025-05-13 PROCEDURE — G2211 COMPLEX E/M VISIT ADD ON: HCPCS | Mod: S$PBB,,,

## 2025-05-13 PROCEDURE — 99214 OFFICE O/P EST MOD 30 MIN: CPT | Mod: PBBFAC,PN

## 2025-05-13 PROCEDURE — 1159F MED LIST DOCD IN RCRD: CPT | Mod: CPTII,,,

## 2025-05-13 PROCEDURE — 99213 OFFICE O/P EST LOW 20 MIN: CPT | Mod: S$PBB,,,

## 2025-05-13 PROCEDURE — 99999 PR PBB SHADOW E&M-EST. PATIENT-LVL IV: CPT | Mod: PBBFAC,,,

## 2025-05-13 PROCEDURE — 3008F BODY MASS INDEX DOCD: CPT | Mod: CPTII,,,

## 2025-05-13 PROCEDURE — 3074F SYST BP LT 130 MM HG: CPT | Mod: CPTII,,,

## 2025-05-13 RX ORDER — CYCLOBENZAPRINE HCL 5 MG
5 TABLET ORAL 3 TIMES DAILY PRN
Qty: 30 TABLET | Refills: 0 | Status: SHIPPED | OUTPATIENT
Start: 2025-05-13 | End: 2025-05-23

## 2025-05-13 NOTE — PROGRESS NOTES
"  Subjective:       Patient ID: Shweta Dela Cruz is a 30 y.o. female.    Chief Complaint: Shoulder Pain    HPI  Patient complains of pain that is localized to left shoulder, nonradiating, rates as intensity mild, but endorses weakness which prompted her to come to clinic.  Symptoms have been present for 1 week.   Initial inciting event: none.       ROS negative except as above  Objective:      /88 (BP Location: Left arm, Patient Position: Sitting)   Pulse 61   Temp 98.4 °F (36.9 °C) (Oral)   Ht 5' 3" (1.6 m)   Wt 56.6 kg (124 lb 12.5 oz)   SpO2 99%   BMI 22.10 kg/m²    Physical Exam  Vitals reviewed.   Constitutional:       Appearance: Normal appearance.   HENT:      Head: Normocephalic.      Mouth/Throat:      Mouth: Mucous membranes are moist.   Cardiovascular:      Rate and Rhythm: Normal rate and regular rhythm.      Pulses: Normal pulses.   Pulmonary:      Effort: Pulmonary effort is normal.   Musculoskeletal:         General: No swelling. Normal range of motion.      Cervical back: Normal range of motion.      Comments: Shoulder: left  Inspection:   No swelling, erythema or skin breakdown.  No atrophy or asymmetry.  Palpation:    No Spasm, Crepitus.  No Tenderness to Palpation to AC joint, supraspinatus, infraspinatus, biceps tendon      Active ROM:  Flexion:unrestricted   Extension:unrestricted   Abduction:unrestricted   External rot:unrestricted  Internal rot:restricted      SPECIAL TESTS:  Rotator Cuff:         - Empty can: positive    - Full can:      - Subscap push-off:  positive  AC joint:   - AC compression: negative   - Crossed arm abd:negative  Impingement tests:        - Neer (I): negative     - Hawkin's (II): negative   Labral tests:         - O'zora's:negative      - Axial Load & Grind:negative   - Bicep Load Test: negative        Skin:     General: Skin is warm.      Coloration: Skin is not jaundiced.   Neurological:      Mental Status: She is alert.         Assessment:       1. " Acute pain of left shoulder        Plan:       1. Acute pain of left shoulder (Primary)  Pain and weakness for the past week.  No inciting event. No trauma.  Physical examination points towards rotator cuff tendinopathy.  We will get x-ray to further evaluate this and send patient to physical therapy.  - Ambulatory Referral/Consult to Physical Therapy; Future  - X-Ray Shoulder 2 or More Views Left; Future  - cyclobenzaprine (FLEXERIL) 5 MG tablet; Take 1 tablet (5 mg total) by mouth 3 (three) times daily as needed for Muscle spasms.  Dispense: 30 tablet; Refill: 0          Follow up in about 11 weeks (around 7/29/2025) for shoulder pain follow up .      Nathaniel Degroot M.D.

## 2025-05-14 ENCOUNTER — HOSPITAL ENCOUNTER (OUTPATIENT)
Dept: RADIOLOGY | Facility: HOSPITAL | Age: 30
Discharge: HOME | End: 2025-05-14
Payer: MEDICAID

## 2025-05-14 DIAGNOSIS — M25.512 ACUTE PAIN OF LEFT SHOULDER: ICD-10-CM

## 2025-05-14 PROCEDURE — 73030 X-RAY EXAM OF SHOULDER: CPT | Mod: TC,FY,LT

## 2025-05-14 PROCEDURE — 73030 X-RAY EXAM OF SHOULDER: CPT | Mod: 26,LT,, | Performed by: RADIOLOGY

## 2025-05-21 ENCOUNTER — CLINICAL SUPPORT (OUTPATIENT)
Dept: REHABILITATION | Facility: HOSPITAL | Age: 30
End: 2025-05-21
Payer: MEDICAID

## 2025-05-21 DIAGNOSIS — R29.3 POSTURAL IMBALANCE: ICD-10-CM

## 2025-05-21 DIAGNOSIS — M25.512 ACUTE PAIN OF LEFT SHOULDER: ICD-10-CM

## 2025-05-21 DIAGNOSIS — R20.8 DECREASED SENSATION: ICD-10-CM

## 2025-05-21 DIAGNOSIS — R29.898 DECREASED STRENGTH OF UPPER EXTREMITY: Primary | ICD-10-CM

## 2025-05-21 DIAGNOSIS — R29.2 DECREASED REFLEX: ICD-10-CM

## 2025-05-21 PROCEDURE — 97530 THERAPEUTIC ACTIVITIES: CPT | Mod: PN

## 2025-05-21 PROCEDURE — 97162 PT EVAL MOD COMPLEX 30 MIN: CPT | Mod: PN

## 2025-05-21 NOTE — PROGRESS NOTES
Outpatient Rehab    Physical Therapy Evaluation    Patient Name: Shweta Dela Cruz  MRN: [id number]  YOB: 1995  Encounter Date: 5/21/2025    Therapy Diagnosis:   Encounter Diagnoses   Name Primary?    Acute pain of left shoulder     Decreased strength of upper extremity Yes    Decreased sensation     Decreased reflex     Postural imbalance      Physician: Nathaniel Degroot MD    Physician Orders: Eval and Treat  Medical Diagnosis: Acute pain of left shoulder    Visit # / Visits Authorized:  1 / 1  Insurance Authorization Period: 5/13/2025 to 5/13/2026  Date of Evaluation: 5/21/2025  Plan of Care Certification: 5/21/2025 to 8/14/2025     Time In: 1405   Time Out: 1449  Total Time (in minutes): 44   Total Billable Time (in minutes): 44    Intake Outcome Measure for FOTO Survey    Therapist reviewed FOTO scores for Shweta Dela Cruz on 5/21/2025.   FOTO report - see Media section or FOTO account episode details.     Intake Score: 47%    Precautions:       Subjective   History of Present Illness  Shweta is a 30 y.o. female who reports to physical therapy with a chief concern of L shoulder.     The patient reports a medical diagnosis of M25.512 (ICD-10-CM) - Acute pain of left shoulder.    Diagnostic tests related to this condition: X-ray.   X-Ray Details: Impression:     Mild widening of the AC joint with slight superior subluxation of the clavicle, possibly sequela of an AC joint sprain.  Correlation with point tenderness recommended.    Dominant Hand: Right  History of Present Condition/Illness: About 3-4 weeks ago she was having some pain in her Left shoulder. The pain was not too bad and then over the weekend she had severe in her neck and into her shoulder that lasted for about a week. She did ice and rest and that subsided. Then about a week ago. She noticed some weakness in her shoulder. The arm felt heavy and she has been having trouble lifting. She did an x-ray and they said it looks like an  AC joint sprain. 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 She does remember anything in particular that started her pain. She does get some numbness in the top of her shoulder. When she touches the top of her shoulder she cannot really feel it but it is not tingling. The pain/numbness does not radiate down to the elbow at all. She is currently having issues with performing anything above her head like washing her hair or doing her hair or anything like that.  She is moving in August to go to grad school.     Pain     Patient reports a current pain level of 2/10. Pain at best is reported as 0/10. Pain at worst is reported as 7/10.   Location: L neck  Clinical Progression (since onset): Unchanged  Pain Qualities: Aching, Sharp, Throbbing, Other (Comment), Dull  Other Pain Qualities: soreness  Pain-Relieving Factors: Rest, Movement, Other (Comment), Massage, Change in position, Ice  Pain-Aggravating Factors: Holding objects, Reaching, Lifting, Other (Comment)  Other Pain-Aggravating Factors: when she first wakes up in the morning         Review of Systems  Patient denies: Night Sweats/Chills, Night Pain, Saddle Numbness, Sleep Disturbance, Weight Gain, Weight Loss, Open Wound, Pitting Edema, and Trophic Changes        Living Arrangements  Living Situation  Housing: Home independently  Living Arrangements: Parent        Employment  Patient reports: Does the patient's condition impact their ability to work?  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    Shweta Dela Cruz  has a past surgical history that includes ASD repair (2003).    Shweta has a current medication list which includes the following prescription(s): buspirone, cyclobenzaprine, metoprolol succinate, lo loestrin  fe, and sertraline.    Review of patient's allergies indicates:  No Known Allergies     Objective   Posture  Patient presents with a Forward head position.       Bilateral scapulae are: Protracted and Upwardly Rotated              Upper Extremity Sensation  General Upper Extremity Sensation  Intact: Right  Impaired: Left  Right Upper Extremity Sensation  Intact: Light Touch, Sharp/Dull Discrimination, Kinesthesia, Proprioception, and Hot/Cold Discrimination  Right Upper Extremity Sensation Stocking Glove Pattern: No    Left Upper Extremity Sensation  Diminished: Light Touch            Left Dermatomes  Left Cervical Dermatome Light Touch  Diminished: C4           Right Upper Extremity Reflexes  Deltoid, C5: Normal (2+)         Brachioradialis, C6: Normal (2+)              Left Upper Extremity Reflexes  Deltoid, C5: Absent (0)         Brachioradialis, C6: Absent (0)                  Subcranial Range of Motion   Active Restricted? Passive Restricted? Pain   Flexion         Protraction         Retraction           Cervical Range of Motion   Active (deg) Passive (deg) Pain   Flexion 40   Yes (midline to L of cervical spine)   Extension 60       Right Lateral Flexion 20   Yes   Right Rotation  (full)       Left Lateral Flexion 60       Left Rotation  (full)         WNL: Extension         Shoulder Range of Motion     Shoulder, Elbow, or Forearm Range of Motion Details: Full active range of motion of bilateral shoulders with increase in difficulty with movement on Left.          Shoulder Strength - Planes of Motion   Right Strength Right Pain Left Strength Left  Pain   Flexion 4+   3-     Extension           ABduction 4+   4-     ADduction           Horizontal ABduction           Horizontal ADduction           Internal Rotation 0° 4+   4-     Internal Rotation 90°           External Rotation 0° 4+   4     External Rotation 90°               Shoulder Strength - Scapular Stabilizing Muscles   Right Strength Right Pain Left  "Strength Left  Pain   Lower Trapezius 4-   3     Middle Trapezius 4-   3     Rhomboids             Shoulder Strength Details  Upper trap compensation with middle and lower trap resisted testing    Elbow Strength   Right Strength Right Pain Left Strength Left  Pain   Flexion (C6) 4+   4     Extension (C7) 4+   4                         Cervical Screen  Tests  Negative: Right Spurling's A, Left Spurling's A, Right Spurling's B, Left Spurling's B, Left Distraction, and Left ULTT2a (Median Variation)  Cervical Range of Motion  Less than 60 degrees rotation to involved side? No  Flexion WNL? No  Extension WNL? Yes  Thoracic Range of Motion             Cervical/Thoracic Special Tests            Cervical Tests  Negative: Left Distraction, Right Spurling's A, and Left Spurling's A  Negative: Right Spurling's B, Left Spurling's B, and Left ULTT2a (Median Variation)           Shoulder Special Tests  Rotator Cuff Tests  Negative: Right Empty Can and Left Empty Can  Negative: Right Full Can and Left Full Can  Impingement Tests  Negative: Right Cross Body ADduction, Left Cross Body ADduction, Right Almonte-Taran, Left Almonte-Taran, Right Infraspinatus Muscle, Left Infraspinatus Muscle, Right Painful Arc, and Left Painful Arc  Shoulder Neural Tension Tests  Negative: Left ULTT2a (Median Variation)       Elbow Special Tests  Elbow Neural Tension Tests  Negative: Left ULTT2a (Median Variation)       Wrist/Hand Special Tests  Upper Limb Tension Tests  Negative: Left ULTT2a (Median Variation)                Treatment:  Therapeutic Activity  TA 1: prone scap retraction 5" hold 2x10  TA 2: side lying L shoulder ER with scap retraction 5" 3x10  TA 3: supine AROM shoulder flexion L 3x10    Time Entry(in minutes):  PT Evaluation (Moderate) Time Entry: 34  Therapeutic Activity Time Entry: 10    Assessment & Plan   Assessment  Shweta presents with a condition of Moderate complexity.   Presentation of Symptoms: Changing  Will " Comorbidities Impact Care: No       Functional Limitations: Activity tolerance, Completing self-care activities, Range of motion, Participating in leisure activities, Pain with ADLs/IADLs, Gross motor coordination  Impairments: Pain with functional activity, Impaired physical strength  Personal Factors Affecting Prognosis: Pain    Patient Goal for Therapy (PT): To improve strength in LUE to be able to perform everyday ADLs and work related tasks  Prognosis: Fair  Assessment Details: Patient is a 30 y.o. female with medical diagnosis of acute pain in Left shoulder. Patient displayed signs and symptoms consisting over decreased sensation of C4 dermatome, decreased reflex of C5 and C6, and global decrease in strength of left upper extremity and postural strengthening of bilateral scapular stabilizers. PT was unable to reproduce pain or symptoms with cervical special tests for cervical radiculopathy. However, patient does have increase in muscle tension of Left upper trap and tenderness that began at cervical origin and continued into muscle belly. Patient denied any changes in gait or balance for cervical myelopathy concern at this time. She would benefit from skilled PT to address functional strength and postural strength deficits to allow her to perform ADLs and work related tasks with little to no difficulty.     Plan  From a physical therapy perspective, the patient would benefit from: Skilled Rehab Services    Planned therapy interventions include: Therapeutic exercise, Therapeutic activities, Neuromuscular re-education, Manual therapy, ADLs/IADLs, and Other (Comment). Dry Needling (prn)  Planned modalities to include: Biofeedback, Electrical stimulation - attended, Electrical stimulation - passive/unattended, Thermotherapy (hot pack), and Cryotherapy (cold pack).        Visit Frequency: 2 times Per Week for 12 Weeks.       This plan was discussed with Patient.   Discussion participants: Agreed Upon Plan of  Care  Plan details: Frequency and duration of treatment to be adjusted as needed          Patient's spiritual, cultural, and educational needs considered and patient agreeable to plan of care and goals.           Goals:   Active       long term goals        Patient will improve FOTO score to 69% to improve self perceived ability to perform functional tasks         Start:  05/22/25    Expected End:  08/14/25            Patient will improve left upper extremity strength to 4+/5 to improve ability to perform functional tasks        Start:  05/22/25    Expected End:  08/14/25            Patient will be able to put a 5lb object on a shelf overhead to improve ability to perform function tasks        Start:  05/22/25    Expected End:  08/14/25            Patient will be able to carry a 10lb object with little to no difficulty to improve ability to perform a functional task       Start:  05/22/25    Expected End:  08/14/25               short term goals        patient will be independent with Home exercise program to supplement therapy        Start:  05/22/25    Expected End:  07/03/25            Patient will be improve cervical flexion to 60 degrees without pain to improve ability to look down without difficulty       Start:  05/22/25    Expected End:  07/03/25            Patient will improve scapular stabilizer strength on Left to 4-/5 to improve ability to perform functional tasks        Start:  05/22/25    Expected End:  07/03/25            Patient will be able to sleep without difficulty to improve quality of life        Start:  05/22/25    Expected End:  07/03/25                Sanjuanita Alaniz, PT ,DPT,OCS  05/22/2025

## 2025-05-22 PROBLEM — R20.8 DECREASED SENSATION: Status: ACTIVE | Noted: 2025-05-22

## 2025-05-22 PROBLEM — R29.3 POSTURAL IMBALANCE: Status: ACTIVE | Noted: 2025-05-22

## 2025-05-22 PROBLEM — R29.2: Status: ACTIVE | Noted: 2025-05-22

## 2025-05-22 PROBLEM — R29.898 DECREASED STRENGTH OF UPPER EXTREMITY: Status: ACTIVE | Noted: 2025-05-22

## 2025-05-26 ENCOUNTER — CLINICAL SUPPORT (OUTPATIENT)
Dept: REHABILITATION | Facility: HOSPITAL | Age: 30
End: 2025-05-26
Payer: MEDICAID

## 2025-05-26 DIAGNOSIS — R29.898 DECREASED STRENGTH OF UPPER EXTREMITY: Primary | ICD-10-CM

## 2025-05-26 DIAGNOSIS — R29.3 POSTURAL IMBALANCE: ICD-10-CM

## 2025-05-26 DIAGNOSIS — R20.8 DECREASED SENSATION: ICD-10-CM

## 2025-05-26 DIAGNOSIS — R29.2 DECREASED REFLEX: ICD-10-CM

## 2025-05-26 PROCEDURE — 97110 THERAPEUTIC EXERCISES: CPT | Mod: PN

## 2025-05-26 NOTE — PROGRESS NOTES
"  Outpatient Rehab    Physical Therapy Visit    Patient Name: Shweta Dela Cruz  MRN: [id number]  YOB: 1995  Encounter Date: 5/26/2025    Therapy Diagnosis:   Encounter Diagnoses   Name Primary?    Decreased strength of upper extremity Yes    Decreased sensation     Decreased reflex     Postural imbalance      Physician: Nathaniel Degroot MD    Physician Orders: Eval and Treat  Medical Diagnosis: Acute pain of left shoulder    Visit # / Visits Authorized:  1 / 20  Insurance Authorization Period: 5/21/2025 to 12/31/2025  Date of Evaluation: 5/21/2025  Plan of Care Certification: 5/22/2025 to 8/14/2025      PT/PTA: PT   Number of PTA visits since last PT visit:0  Time In: 1105   Time Out: 1200  Total Time (in minutes): 55   Total Billable Time (in minutes): 55    FOTO:  Intake Score:  %  Survey Score 2:  %  Survey Score 3:  %    Precautions:       Subjective   She was having some pain in her L upper trap when she would breath. She feelsl tim the pain is a little worse than the other day..  Pain reported as 5/10.      Objective            Treatment:  Therapeutic Exercise  TE 1: seated thoracic extension 1/2 foam roll with hands behind head 10"x20  TE 2: side lying open books 5"x20 ea  Manual Therapy  MT 1: suboccipital release  MT 2:  and side glides to cervical spine grade ii-iii  Balance/Neuromuscular Re-Education  NMR 1: supine serratus punch 4lb wand 3x10  NMR 2: craniocervical flexion with BP 24mmHg 10"x10  NMR 3: prone scap retraction 5" hold 3x10  NMR 4: prone chicken wings 5" 3x10    Time Entry(in minutes):  Manual Therapy Time Entry: 10  Neuromuscular Re-Education Time Entry: 35  Therapeutic Activity Time Entry: 10    Assessment & Plan   Assessment: Patient arrived to today's session with minor reports of pain/discomfort. PT continued with exercises for strengthening of scapular stabilizers as well as improvements in craniocervical flexion endurance and strengthening. She required verbal " cues to decrease upper trap compensation with supine serratus punch today. Patient reported relief of symptoms at end of session with manual. She would continue to benefit from skilled PT to address deficits and improve overall function.       The patient will continue to benefit from skilled outpatient physical therapy in order to address the deficits listed in the problem list on the initial evaluation, provide patient and family education, and maximize the patients level of independence in the home and community environments.     The patient's spiritual, cultural, and educational needs were considered, and the patient is agreeable to the plan of care and goals.           Plan: Improve postural and scapular stabilizer strength    Goals:   Active       long term goals        Patient will improve FOTO score to 69% to improve self perceived ability to perform functional tasks         Start:  05/22/25    Expected End:  08/14/25            Patient will improve left upper extremity strength to 4+/5 to improve ability to perform functional tasks        Start:  05/22/25    Expected End:  08/14/25            Patient will be able to put a 5lb object on a shelf overhead to improve ability to perform function tasks        Start:  05/22/25    Expected End:  08/14/25            Patient will be able to carry a 10lb object with little to no difficulty to improve ability to perform a functional task       Start:  05/22/25    Expected End:  08/14/25               short term goals        patient will be independent with Home exercise program to supplement therapy        Start:  05/22/25    Expected End:  07/03/25            Patient will be improve cervical flexion to 60 degrees without pain to improve ability to look down without difficulty       Start:  05/22/25    Expected End:  07/03/25            Patient will improve scapular stabilizer strength on Left to 4-/5 to improve ability to perform functional tasks        Start:   05/22/25    Expected End:  07/03/25            Patient will be able to sleep without difficulty to improve quality of life        Start:  05/22/25    Expected End:  07/03/25                Sanjuanita Alaniz, PT ,DPT,OCS  05/26/2025

## 2025-05-27 DIAGNOSIS — F32.A DEPRESSION, UNSPECIFIED DEPRESSION TYPE: ICD-10-CM

## 2025-05-28 ENCOUNTER — CLINICAL SUPPORT (OUTPATIENT)
Dept: REHABILITATION | Facility: HOSPITAL | Age: 30
End: 2025-05-28
Payer: MEDICAID

## 2025-05-28 DIAGNOSIS — R29.2 DECREASED REFLEX: ICD-10-CM

## 2025-05-28 DIAGNOSIS — R29.898 DECREASED STRENGTH OF UPPER EXTREMITY: Primary | ICD-10-CM

## 2025-05-28 DIAGNOSIS — R20.8 DECREASED SENSATION: ICD-10-CM

## 2025-05-28 DIAGNOSIS — R29.3 POSTURAL IMBALANCE: ICD-10-CM

## 2025-05-28 PROCEDURE — 97110 THERAPEUTIC EXERCISES: CPT | Mod: PN

## 2025-05-28 RX ORDER — SERTRALINE HYDROCHLORIDE 100 MG/1
TABLET, FILM COATED ORAL
Qty: 135 TABLET | Refills: 3 | Status: SHIPPED | OUTPATIENT
Start: 2025-05-28

## 2025-05-28 NOTE — PROGRESS NOTES
"  Outpatient Rehab    Physical Therapy Visit    Patient Name: Shweta Dela Cruz  MRN: [id number]  YOB: 1995  Encounter Date: 5/28/2025    Therapy Diagnosis:   Encounter Diagnoses   Name Primary?    Decreased strength of upper extremity Yes    Decreased sensation     Decreased reflex     Postural imbalance      Physician: Nathaniel Degroot MD    Physician Orders: Eval and Treat  Medical Diagnosis: Acute pain of left shoulder    Visit # / Visits Authorized:  2 / 20  Insurance Authorization Period: 5/21/2025 to 12/31/2025  Date of Evaluation: 5/21/2025  Plan of Care Certification: 5/22/2025 to 8/14/2025      PT/PTA:     Number of PTA visits since last PT visit:   Time In: 1015   Time Out: 1105  Total Time (in minutes): 50   Total Billable Time (in minutes): 50    FOTO:  Intake Score:  %  Survey Score 2:  %  Survey Score 3:  %    Precautions:       Subjective   She is feeling better than she normally does. She was sore after her last visit but she did okay. She is still having some soreness when she breathes..  Pain reported as 2/10. L neck    Objective            Treatment:  Therapeutic Exercise  TE 1: seated thoracic extension 1/2 foam roll with hands behind head 10"x20  TE 2: side lying open books 5"x20 ea  Manual Therapy  MT 1: suboccipital release  MT 2:  and side glides to cervical spine grade ii-iii  MT 3: L scalene stretch  Balance/Neuromuscular Re-Education  NMR 1: supine serratus punch 4lb wand 3x10  NMR 2: craniocervical flexion with BP 22-24mmHg 10"x10 ea  NMR 3: prone scap retraction 5" hold 3x10  NMR 4: prone chicken wings 5" 2x10    Time Entry(in minutes):  Manual Therapy Time Entry: 10  Neuromuscular Re-Education Time Entry: 30  Therapeutic Exercise Time Entry: 10    Assessment & Plan   Assessment: Patient arrived to today's session with some improvement in pain/symptoms as compared to previous session. She continues to display hypomobility of lower cervical spine around C6-7 with side " alma and Pauline. PT continued with mobility as well as postural strengthening exercises. She is most challenged with prone scapular stabilizer stregnthening. She would continue to benefit from skilled PT to address deficits and improve QOL.       The patient will continue to benefit from skilled outpatient physical therapy in order to address the deficits listed in the problem list on the initial evaluation, provide patient and family education, and maximize the patients level of independence in the home and community environments.     The patient's spiritual, cultural, and educational needs were considered, and the patient is agreeable to the plan of care and goals.           Plan: Improve postural and scapular stabilizer strength    Goals:   Active       long term goals        Patient will improve FOTO score to 69% to improve self perceived ability to perform functional tasks         Start:  05/22/25    Expected End:  08/14/25            Patient will improve left upper extremity strength to 4+/5 to improve ability to perform functional tasks        Start:  05/22/25    Expected End:  08/14/25            Patient will be able to put a 5lb object on a shelf overhead to improve ability to perform function tasks        Start:  05/22/25    Expected End:  08/14/25            Patient will be able to carry a 10lb object with little to no difficulty to improve ability to perform a functional task       Start:  05/22/25    Expected End:  08/14/25               short term goals        patient will be independent with Home exercise program to supplement therapy        Start:  05/22/25    Expected End:  07/03/25            Patient will be improve cervical flexion to 60 degrees without pain to improve ability to look down without difficulty       Start:  05/22/25    Expected End:  07/03/25            Patient will improve scapular stabilizer strength on Left to 4-/5 to improve ability to perform functional tasks        Start:   05/22/25    Expected End:  07/03/25            Patient will be able to sleep without difficulty to improve quality of life        Start:  05/22/25    Expected End:  07/03/25                Sanjuanita Alaniz, PT ,DPT,OCS  05/28/2025

## 2025-06-02 ENCOUNTER — CLINICAL SUPPORT (OUTPATIENT)
Dept: REHABILITATION | Facility: HOSPITAL | Age: 30
End: 2025-06-02
Payer: MEDICAID

## 2025-06-02 DIAGNOSIS — R29.3 POSTURAL IMBALANCE: ICD-10-CM

## 2025-06-02 DIAGNOSIS — R29.898 DECREASED STRENGTH OF UPPER EXTREMITY: Primary | ICD-10-CM

## 2025-06-02 DIAGNOSIS — R29.2 DECREASED REFLEX: ICD-10-CM

## 2025-06-02 DIAGNOSIS — R20.8 DECREASED SENSATION: ICD-10-CM

## 2025-06-02 PROCEDURE — 97110 THERAPEUTIC EXERCISES: CPT | Mod: PN

## 2025-06-04 ENCOUNTER — CLINICAL SUPPORT (OUTPATIENT)
Dept: REHABILITATION | Facility: HOSPITAL | Age: 30
End: 2025-06-04
Payer: MEDICAID

## 2025-06-04 DIAGNOSIS — R29.2 DECREASED REFLEX: ICD-10-CM

## 2025-06-04 DIAGNOSIS — R29.898 DECREASED STRENGTH OF UPPER EXTREMITY: Primary | ICD-10-CM

## 2025-06-04 DIAGNOSIS — R29.3 POSTURAL IMBALANCE: ICD-10-CM

## 2025-06-04 DIAGNOSIS — R20.8 DECREASED SENSATION: ICD-10-CM

## 2025-06-04 PROCEDURE — 97110 THERAPEUTIC EXERCISES: CPT | Mod: PN

## 2025-06-16 ENCOUNTER — CLINICAL SUPPORT (OUTPATIENT)
Dept: REHABILITATION | Facility: HOSPITAL | Age: 30
End: 2025-06-16
Payer: MEDICAID

## 2025-06-16 DIAGNOSIS — R29.2 DECREASED REFLEX: ICD-10-CM

## 2025-06-16 DIAGNOSIS — R29.898 DECREASED STRENGTH OF UPPER EXTREMITY: Primary | ICD-10-CM

## 2025-06-16 DIAGNOSIS — R29.3 POSTURAL IMBALANCE: ICD-10-CM

## 2025-06-16 DIAGNOSIS — R20.8 DECREASED SENSATION: ICD-10-CM

## 2025-06-16 PROCEDURE — 97110 THERAPEUTIC EXERCISES: CPT | Mod: PN

## 2025-06-16 NOTE — PROGRESS NOTES
"  Outpatient Rehab    Physical Therapy Visit    Patient Name: Shweta Dela Cruz  MRN: [id number]  YOB: 1995  Encounter Date: 6/16/2025    Therapy Diagnosis:   Encounter Diagnoses   Name Primary?    Decreased strength of upper extremity Yes    Decreased sensation     Decreased reflex     Postural imbalance      Physician: Nathaniel Degroot MD    Physician Orders: Eval and Treat  Medical Diagnosis: Acute pain of left shoulder    Visit # / Visits Authorized:  5 / 12  Insurance Authorization Period: 5/21/2025 to 6/30/2025  Date of Evaluation: 5/21/2025  Plan of Care Certification: 5/22/2025 to 8/14/2025      PT/PTA: PT   Number of PTA visits since last PT visit:0  Time In: 1100 (patient late)  Time Out: 1203  Total Time (in minutes): 63   Total Billable Time (in minutes): 63    FOTO:  Intake Score:  %  Survey Score 2:  %  Survey Score 3:  %    Precautions:       Subjective   The arm has been doing better. She still has difficulty with lifting groceries up on the counter. She can wash her hair better. The more that she uses it then it becomes weaker because it is fatigue from being used a lot. She still will just get some stiffness in her shoulder..  Pain reported as 0/10.      Objective            Treatment:  Therapeutic Exercise  TE 1: seated thoracic extension 1/2 foam roll with hands behind head 10"x20  TE 2: side lying open books 5"x20 ea  TE 3: UBE 4/4  Manual Therapy  MT 1: suboccipital release  MT 2:  and side glides to cervical spine grade ii-iii  Balance/Neuromuscular Re-Education  NMR 1: supine serratus punch 4lb wand 3x10  NMR 2: craniocervical flexion with BP 22-26mmHg 10"x10 ea  NMR 3: prone scap retraction 5" hold 3x10  NMR 4: prone chicken wings 5" 2x10  NMR 5: prone chest press 4lb 3x10  NMR 6: supine wand flexion 4lb 3x10  Therapeutic Activity  TA 1: supine D2 shoulder flexion YTB 3x10 L    Time Entry(in minutes):  Manual Therapy Time Entry: 10  Neuromuscular Re-Education Time Entry: " 33  Therapeutic Activity Time Entry: 5  Therapeutic Exercise Time Entry: 15    Assessment & Plan   Assessment: Patient arrived to today's session with no pain. However, she continues to have numbness over lateral shoulder and overall decrease in strength of left upper extremity as compared to Right. PT progressed with D2 shoulder flexion in supine to add functional movement pattern with strengthening. Patient reported appropriate level of fatigue and challenge with progression. PT to add more strengthening at next visit to address patient complaints of weakness.        The patient will continue to benefit from skilled outpatient physical therapy in order to address the deficits listed in the problem list on the initial evaluation, provide patient and family education, and maximize the patients level of independence in the home and community environments.     The patient's spiritual, cultural, and educational needs were considered, and the patient is agreeable to the plan of care and goals.           Plan: stability and postural strengthening exercises    Goals:   Active       long term goals        Patient will improve FOTO score to 69% to improve self perceived ability to perform functional tasks         Start:  05/22/25    Expected End:  08/14/25            Patient will improve left upper extremity strength to 4+/5 to improve ability to perform functional tasks        Start:  05/22/25    Expected End:  08/14/25            Patient will be able to put a 5lb object on a shelf overhead to improve ability to perform function tasks        Start:  05/22/25    Expected End:  08/14/25            Patient will be able to carry a 10lb object with little to no difficulty to improve ability to perform a functional task       Start:  05/22/25    Expected End:  08/14/25               short term goals        patient will be independent with Home exercise program to supplement therapy        Start:  05/22/25    Expected End:   07/03/25            Patient will be improve cervical flexion to 60 degrees without pain to improve ability to look down without difficulty       Start:  05/22/25    Expected End:  07/03/25            Patient will improve scapular stabilizer strength on Left to 4-/5 to improve ability to perform functional tasks        Start:  05/22/25    Expected End:  07/03/25            Patient will be able to sleep without difficulty to improve quality of life        Start:  05/22/25    Expected End:  07/03/25                Sanjuanita Alaniz, PT ,DPT,OCS  06/16/2025

## 2025-06-18 ENCOUNTER — CLINICAL SUPPORT (OUTPATIENT)
Dept: REHABILITATION | Facility: HOSPITAL | Age: 30
End: 2025-06-18
Payer: MEDICAID

## 2025-06-18 DIAGNOSIS — R29.898 DECREASED STRENGTH OF UPPER EXTREMITY: Primary | ICD-10-CM

## 2025-06-18 DIAGNOSIS — R20.8 DECREASED SENSATION: ICD-10-CM

## 2025-06-18 DIAGNOSIS — R29.2 DECREASED REFLEX: ICD-10-CM

## 2025-06-18 DIAGNOSIS — R29.3 POSTURAL IMBALANCE: ICD-10-CM

## 2025-06-18 PROCEDURE — 97110 THERAPEUTIC EXERCISES: CPT | Mod: PN

## 2025-06-18 NOTE — PROGRESS NOTES
"  Outpatient Rehab    Physical Therapy Visit    Patient Name: Shweta Dela Cruz  MRN: [id number]  YOB: 1995  Encounter Date: 6/18/2025    Therapy Diagnosis:   Encounter Diagnoses   Name Primary?    Decreased strength of upper extremity Yes    Decreased sensation     Decreased reflex     Postural imbalance      Physician: Nathaniel Degroot MD    Physician Orders: Eval and Treat  Medical Diagnosis: Acute pain of left shoulder    Visit # / Visits Authorized:  6 / 12  Insurance Authorization Period: 5/21/2025 to 6/30/2025  Date of Evaluation: 5/21/2025  Plan of Care Certification: 5/22/2025 to 8/14/2025      PT/PTA: PT   Number of PTA visits since last PT visit:0  Time In: 1105   Time Out: 1205  Total Time (in minutes): 60   Total Billable Time (in minutes): 60    FOTO:  Intake Score:  %  Survey Score 2:  %  Survey Score 3:  %    Precautions:       Subjective   She just felt more tension after her last visit and a migraine when she went to sleep. However, she was able to do ballet and keep her arms up. She did have difficulty still but she was able to do it..         Objective            Treatment:  Therapeutic Exercise  TE 1: seated thoracic extension 1/2 foam roll with hands behind head 10"x20  TE 2: side lying open books 5"x20 ea  TE 3: UBE 4/4  Manual Therapy  MT 1: suboccipital release  MT 2:  and side glides to cervical spine grade ii-iii  Balance/Neuromuscular Re-Education  NMR 1: supine serratus punch 4lb wand 3x10  NMR 2: craniocervical flexion with BP 22-26mmHg 10"x10 ea  NMR 5: prone chest press 4lb 3x10  NMR 6: supine wand flexion 3lb 3x10  Therapeutic Activity  TA 1: supine D2 shoulder flexion YTB 3x10 L    Time Entry(in minutes):  Manual Therapy Time Entry: 10  Neuromuscular Re-Education Time Entry: 30  Therapeutic Activity Time Entry: 5  Therapeutic Exercise Time Entry: 15    Assessment & Plan   Assessment: Patient arrived to today's session with no reports of pain. However, she had " some increase in muscle tension and migraine after progressions made at last visit. PT began session with UBE and followed with mobility exercises and manual in hopes of decreasing tension after today's session. 2/2 time constraints prone exercises were not performed today. However, she was able to complete all other exercises without reports of pain.        The patient will continue to benefit from skilled outpatient physical therapy in order to address the deficits listed in the problem list on the initial evaluation, provide patient and family education, and maximize the patients level of independence in the home and community environments.     The patient's spiritual, cultural, and educational needs were considered, and the patient is agreeable to the plan of care and goals.           Plan: stability and postural strengthening exercises    Goals:   Active       long term goals        Patient will improve FOTO score to 69% to improve self perceived ability to perform functional tasks         Start:  05/22/25    Expected End:  08/14/25            Patient will improve left upper extremity strength to 4+/5 to improve ability to perform functional tasks        Start:  05/22/25    Expected End:  08/14/25            Patient will be able to put a 5lb object on a shelf overhead to improve ability to perform function tasks        Start:  05/22/25    Expected End:  08/14/25            Patient will be able to carry a 10lb object with little to no difficulty to improve ability to perform a functional task       Start:  05/22/25    Expected End:  08/14/25               short term goals        patient will be independent with Home exercise program to supplement therapy        Start:  05/22/25    Expected End:  07/03/25            Patient will be improve cervical flexion to 60 degrees without pain to improve ability to look down without difficulty       Start:  05/22/25    Expected End:  07/03/25            Patient will  improve scapular stabilizer strength on Left to 4-/5 to improve ability to perform functional tasks        Start:  05/22/25    Expected End:  07/03/25            Patient will be able to sleep without difficulty to improve quality of life        Start:  05/22/25    Expected End:  07/03/25                Sanjuanita Alaniz, PT ,DPT,OCS  06/18/2025

## 2025-06-23 ENCOUNTER — CLINICAL SUPPORT (OUTPATIENT)
Dept: REHABILITATION | Facility: HOSPITAL | Age: 30
End: 2025-06-23
Payer: MEDICAID

## 2025-06-23 DIAGNOSIS — R29.2 DECREASED REFLEX: ICD-10-CM

## 2025-06-23 DIAGNOSIS — R29.898 DECREASED STRENGTH OF UPPER EXTREMITY: Primary | ICD-10-CM

## 2025-06-23 DIAGNOSIS — R29.3 POSTURAL IMBALANCE: ICD-10-CM

## 2025-06-23 DIAGNOSIS — R20.8 DECREASED SENSATION: ICD-10-CM

## 2025-06-23 PROCEDURE — 97110 THERAPEUTIC EXERCISES: CPT | Mod: PN

## 2025-06-23 NOTE — PROGRESS NOTES
Outpatient Rehab    Physical Therapy Progress Note    Patient Name: Shweta Dela Cruz  MRN: [id number]  YOB: 1995  Encounter Date: 6/23/2025    Therapy Diagnosis:   Encounter Diagnoses   Name Primary?    Decreased strength of upper extremity Yes    Decreased sensation     Decreased reflex     Postural imbalance      Physician: Nathaniel Degroot MD    Physician Orders: Eval and Treat  Medical Diagnosis: Acute pain of left shoulder  Surgical Diagnosis: Not applicable for this Episode   Surgical Date: Not applicable for this Episode  Days Since Last Surgery: Not applicable for this Episode    Visit # / Visits Authorized:  7 / 12  Insurance Authorization Period: 5/21/2025 to 6/30/2025  Date of Evaluation: 5/21/2025  Plan of Care Certification: 5/22/2025 to 8/14/2025      PT/PTA: PT   Number of PTA visits since last PT visit:0  Time In: 1100   Time Out: 1154  Total Time (in minutes): 54   Total Billable Time (in minutes): 54    FOTO:  Intake Score: 47%  Survey Score 2: 58%  Survey Score 3:  %    Precautions:       Subjective   She did pilates over the weekend and she is a little sore today..         Objective      Subcranial Range of Motion   Active Restricted? Passive Restricted? Pain   Flexion         Protraction         Retraction           Cervical Range of Motion   Active (deg) Passive (deg) Pain   Flexion 50       Extension 60       Right Lateral Flexion 40       Right Rotation 55       Left Lateral Flexion 60       Left Rotation 60                   Shoulder Strength - Planes of Motion   Right Strength Right Pain Left Strength Left  Pain   Flexion 4+   4-     Extension           ABduction 4+   4+     ADduction           Horizontal ABduction           Horizontal ADduction           Internal Rotation 0° 5   4-     Internal Rotation 90°           External Rotation 0° 4+   4+     External Rotation 90°                            Treatment:  Therapeutic Exercise  TE 1: seated thoracic extension 1/2 foam  "roll with hands behind head 10"x20  TE 2: side lying open books 5"x20 ea  TE 3: UBE 4/4  TE 4: SNAG R cervical rotation 5"x20  Balance/Neuromuscular Re-Education  NMR 1: supine serratus punch 4lb wand 3x10  NMR 2: craniocervical flexion with BP 22-26mmHg 10"x10 ea  NMR 3: prone scap retraction 5" hold 3x10  NMR 4: prone chicken wings 5" 2x10  NMR 5: supine chest press 4lb 3x10  NMR 6: supine wand flexion 3lb 3x10    Time Entry(in minutes):  Neuromuscular Re-Education Time Entry: 40  Therapeutic Exercise Time Entry: 15    Assessment & Plan   Assessment: Shweta was reassessed today and displayed improvements in cervical range of motion, left upper extremity strength, and FOTO score. She continues to have some limitations with cervical right rotation and side bending. She also continues to have decrease in left upper extremity strength as compared to Right. She would continue to benefit from skilled PT to address deficits to improve function and quality of life.        The patient will continue to benefit from skilled outpatient physical therapy in order to address the deficits listed in the problem list on the initial evaluation, provide patient and family education, and maximize the patients level of independence in the home and community environments.     The patient's spiritual, cultural, and educational needs were considered, and the patient is agreeable to the plan of care and goals.           Plan: cervical mobility, stability and postural strengthening exercises    Goals:   Active       long term goals        Patient will improve FOTO score to 69% to improve self perceived ability to perform functional tasks         Start:  05/22/25    Expected End:  08/14/25            Patient will improve left upper extremity strength to 4+/5 to improve ability to perform functional tasks        Start:  05/22/25    Expected End:  08/14/25            Patient will be able to put a 5lb object on a shelf overhead to improve " ability to perform function tasks        Start:  05/22/25    Expected End:  08/14/25            Patient will be able to carry a 10lb object with little to no difficulty to improve ability to perform a functional task       Start:  05/22/25    Expected End:  08/14/25               short term goals        patient will be independent with Home exercise program to supplement therapy  (Met)       Start:  05/22/25    Expected End:  07/03/25    Resolved:  06/23/25         Patient will be improve cervical flexion to 60 degrees without pain to improve ability to look down without difficulty       Start:  05/22/25    Expected End:  07/03/25            Patient will improve scapular stabilizer strength on Left to 4-/5 to improve ability to perform functional tasks        Start:  05/22/25    Expected End:  07/03/25            Patient will be able to sleep without difficulty to improve quality of life  (Met)       Start:  05/22/25    Expected End:  07/03/25    Resolved:  06/23/25             Sanjuanita Alaniz, PT

## 2025-06-25 ENCOUNTER — CLINICAL SUPPORT (OUTPATIENT)
Dept: REHABILITATION | Facility: HOSPITAL | Age: 30
End: 2025-06-25
Payer: MEDICAID

## 2025-06-25 DIAGNOSIS — R20.8 DECREASED SENSATION: ICD-10-CM

## 2025-06-25 DIAGNOSIS — R29.3 POSTURAL IMBALANCE: ICD-10-CM

## 2025-06-25 DIAGNOSIS — R29.898 DECREASED STRENGTH OF UPPER EXTREMITY: Primary | ICD-10-CM

## 2025-06-25 DIAGNOSIS — R29.2 DECREASED REFLEX: ICD-10-CM

## 2025-06-25 PROCEDURE — 97110 THERAPEUTIC EXERCISES: CPT | Mod: PN

## 2025-06-25 NOTE — PROGRESS NOTES
"  Outpatient Rehab    Physical Therapy Progress Note    Patient Name: Shweta Dela Cruz  MRN: [id number]  YOB: 1995  Encounter Date: 6/25/2025    Therapy Diagnosis:   Encounter Diagnoses   Name Primary?    Decreased strength of upper extremity Yes    Decreased sensation     Decreased reflex     Postural imbalance      Physician: Nathaniel Degroot MD    Physician Orders: Eval and Treat  Medical Diagnosis: Acute pain of left shoulder  Surgical Diagnosis: Not applicable for this Episode   Surgical Date: Not applicable for this Episode  Days Since Last Surgery: Not applicable for this Episode    Visit # / Visits Authorized:  8 / 12  Insurance Authorization Period: 5/21/2025 to 6/30/2025  Date of Evaluation: 5/21/2025  Plan of Care Certification: 5/22/2025 to 8/14/2025      PT/PTA: PT   Number of PTA visits since last PT visit:0  Time In: 1105   Time Out: 1159  Total Time (in minutes): 54   Total Billable Time (in minutes): 54    FOTO:  Intake Score:  %  Survey Score 2:  %  Survey Score 3:  %    Precautions:       Subjective   She is feeling good today. She had a slight headache after her last session but it improved with taking some medication..  Pain reported as 0/10. L neck    Objective            Treatment:  Therapeutic Exercise  TE 1: seated thoracic extension 1/2 foam roll with hands behind head 10"x20  TE 2: side lying open books 5"x20 ea  TE 3: UBE 4/4  TE 4: SNAG R cervical rotation 5"x20  Manual Therapy  MT 1: suboccipital release  MT 2:  and side glides to cervical spine grade ii-iii  Balance/Neuromuscular Re-Education  NMR 2: craniocervical flexion with BP 22-26mmHg 10"x10 ea  NMR 4: prone chicken wings 5" 2x10  Therapeutic Activity  TA 1: supine D2 shoulder flexion YTB 3x10 L    Time Entry(in minutes):  Manual Therapy Time Entry: 12  Neuromuscular Re-Education Time Entry: 8  Therapeutic Activity Time Entry: 5  Therapeutic Exercise Time Entry: 20    Assessment & Plan   Assessment: Patient " arrived to today's session with reports of pain. She continues to display some hypomobility throughout lower cervical spine with  and side glides. She displayed improvements with strength in functional movement pattern with supine D2 shoulder flexion. She would continue to benefit from skilled PT to address neuromuscular control and strength deficits to improve overall function.        The patient will continue to benefit from skilled outpatient physical therapy in order to address the deficits listed in the problem list on the initial evaluation, provide patient and family education, and maximize the patients level of independence in the home and community environments.     The patient's spiritual, cultural, and educational needs were considered, and the patient is agreeable to the plan of care and goals.           Plan: cervical mobility, stability and postural strengthening exercises    Goals:   Active       long term goals        Patient will improve FOTO score to 69% to improve self perceived ability to perform functional tasks         Start:  05/22/25    Expected End:  08/14/25            Patient will improve left upper extremity strength to 4+/5 to improve ability to perform functional tasks        Start:  05/22/25    Expected End:  08/14/25            Patient will be able to put a 5lb object on a shelf overhead to improve ability to perform function tasks        Start:  05/22/25    Expected End:  08/14/25            Patient will be able to carry a 10lb object with little to no difficulty to improve ability to perform a functional task       Start:  05/22/25    Expected End:  08/14/25               short term goals        patient will be independent with Home exercise program to supplement therapy  (Met)       Start:  05/22/25    Expected End:  07/03/25    Resolved:  06/23/25         Patient will be improve cervical flexion to 60 degrees without pain to improve ability to look down without difficulty        Start:  05/22/25    Expected End:  07/03/25            Patient will improve scapular stabilizer strength on Left to 4-/5 to improve ability to perform functional tasks        Start:  05/22/25    Expected End:  07/03/25            Patient will be able to sleep without difficulty to improve quality of life  (Met)       Start:  05/22/25    Expected End:  07/03/25    Resolved:  06/23/25             Sanjuanita Alaniz, PT ,DPT,OCS  06/25/2025

## 2025-06-30 ENCOUNTER — PATIENT MESSAGE (OUTPATIENT)
Dept: REHABILITATION | Facility: HOSPITAL | Age: 30
End: 2025-06-30
Payer: MEDICAID

## 2025-07-02 ENCOUNTER — CLINICAL SUPPORT (OUTPATIENT)
Dept: REHABILITATION | Facility: HOSPITAL | Age: 30
End: 2025-07-02
Payer: MEDICAID

## 2025-07-02 DIAGNOSIS — R29.898 DECREASED STRENGTH OF UPPER EXTREMITY: Primary | ICD-10-CM

## 2025-07-02 DIAGNOSIS — R29.3 POSTURAL IMBALANCE: ICD-10-CM

## 2025-07-02 DIAGNOSIS — R20.8 DECREASED SENSATION: ICD-10-CM

## 2025-07-02 DIAGNOSIS — R29.2 DECREASED REFLEX: ICD-10-CM

## 2025-07-02 PROCEDURE — 97110 THERAPEUTIC EXERCISES: CPT | Mod: PN

## 2025-07-02 NOTE — PROGRESS NOTES
"  Outpatient Rehab    Physical Therapy Progress Note    Patient Name: Shweta Dela Cruz  MRN: [id number]  YOB: 1995  Encounter Date: 7/2/2025    Therapy Diagnosis:   Encounter Diagnoses   Name Primary?    Decreased strength of upper extremity Yes    Decreased sensation     Decreased reflex     Postural imbalance      Physician: Nathaniel Degroot MD    Physician Orders: Eval and Treat  Medical Diagnosis: Acute pain of left shoulder  Surgical Diagnosis: Not applicable for this Episode   Surgical Date: Not applicable for this Episode  Days Since Last Surgery: Not applicable for this Episode    Visit # / Visits Authorized:  9 / 12  Insurance Authorization Period: 5/21/2025 to 12/31/2025  Date of Evaluation: 5/21/2025  Plan of Care Certification: 5/22/2025 to 8/14/2025      PT/PTA: PT   Number of PTA visits since last PT visit:0  Time In: 0909   Time Out: 0955  Total Time (in minutes): 46   Total Billable Time (in minutes): 46    FOTO:  Intake Score:  %  Survey Score 2:  %  Survey Score 3:  %    Precautions:       Subjective   She is feeling a little tight but okay overall.  Pain reported as 1/10. L neck    Objective            Treatment:  Therapeutic Exercise  TE 1: seated thoracic extension 1/2 foam roll with hands behind head 10"x20  TE 2: side lying open books 5"x20 ea  TE 3: UBE 4/4  TE 4: SNAG R cervical rotation 5"x20  Manual Therapy  MT 1: suboccipital release  MT 2:  and side glides to cervical spine grade ii-iii  Balance/Neuromuscular Re-Education  NMR 2: craniocervical flexion with BP 22-26mmHg 10"x10 ea  Therapeutic Activity  TA 1: supine D2 shoulder flexion YTB 3x10 L      Time Entry(in minutes):  Manual Therapy Time Entry: 10  Therapeutic Activity Time Entry: 5  Therapeutic Exercise Time Entry: 31    Assessment & Plan   Assessment: Patient arrived to today's session with reports of tightness. PT continued with manual to cervical spine to improve mobility. 2/2 time constraints not all " exercises were performed today. PT to progress strengthening with functional movement at next visit.        The patient will continue to benefit from skilled outpatient physical therapy in order to address the deficits listed in the problem list on the initial evaluation, provide patient and family education, and maximize the patients level of independence in the home and community environments.     The patient's spiritual, cultural, and educational needs were considered, and the patient is agreeable to the plan of care and goals.           Plan: cervical mobility, stability and postural strengthening exercises    Goals:   Active       long term goals        Patient will improve FOTO score to 69% to improve self perceived ability to perform functional tasks         Start:  05/22/25    Expected End:  08/14/25            Patient will improve left upper extremity strength to 4+/5 to improve ability to perform functional tasks        Start:  05/22/25    Expected End:  08/14/25            Patient will be able to put a 5lb object on a shelf overhead to improve ability to perform function tasks        Start:  05/22/25    Expected End:  08/14/25            Patient will be able to carry a 10lb object with little to no difficulty to improve ability to perform a functional task       Start:  05/22/25    Expected End:  08/14/25               short term goals        patient will be independent with Home exercise program to supplement therapy  (Met)       Start:  05/22/25    Expected End:  07/03/25    Resolved:  06/23/25         Patient will be improve cervical flexion to 60 degrees without pain to improve ability to look down without difficulty       Start:  05/22/25    Expected End:  07/03/25            Patient will improve scapular stabilizer strength on Left to 4-/5 to improve ability to perform functional tasks        Start:  05/22/25    Expected End:  07/03/25            Patient will be able to sleep without difficulty to  improve quality of life  (Met)       Start:  05/22/25    Expected End:  07/03/25    Resolved:  06/23/25             Sanjuanita Alaniz, PT ,DPT,OCS  07/02/2025

## 2025-07-14 ENCOUNTER — CLINICAL SUPPORT (OUTPATIENT)
Dept: REHABILITATION | Facility: HOSPITAL | Age: 30
End: 2025-07-14
Payer: MEDICAID

## 2025-07-14 DIAGNOSIS — R29.3 POSTURAL IMBALANCE: ICD-10-CM

## 2025-07-14 DIAGNOSIS — R29.2 DECREASED REFLEX: ICD-10-CM

## 2025-07-14 DIAGNOSIS — R20.8 DECREASED SENSATION: ICD-10-CM

## 2025-07-14 DIAGNOSIS — R29.898 DECREASED STRENGTH OF UPPER EXTREMITY: Primary | ICD-10-CM

## 2025-07-14 PROCEDURE — 97110 THERAPEUTIC EXERCISES: CPT | Mod: PN

## 2025-07-14 NOTE — PROGRESS NOTES
"  Outpatient Rehab    Physical Therapy Visit    Patient Name: Shweta Dela Cruz  MRN: [id number]  YOB: 1995  Encounter Date: 7/14/2025    Therapy Diagnosis:   Encounter Diagnoses   Name Primary?    Decreased strength of upper extremity Yes    Decreased sensation     Decreased reflex     Postural imbalance      Physician: Nathaniel Degroot MD    Physician Orders: Eval and Treat  Medical Diagnosis: Acute pain of left shoulder  Surgical Diagnosis: Not applicable for this Episode   Surgical Date: Not applicable for this Episode  Days Since Last Surgery: Not applicable for this Episode    Visit # / Visits Authorized:  10 / 12  Insurance Authorization Period: 5/21/2025 to 12/31/2025  Date of Evaluation: 5/21/2025  Plan of Care Certification: 5/22/2025 to 8/14/2025      PT/PTA: PT   Number of PTA visits since last PT visit:0  Time In: 1400   Time Out: 1451  Total Time (in minutes): 51   Total Billable Time (in minutes): 51    FOTO:  Intake Score: 47%  Survey Score 2: 58%  Survey Score 3: Not applicable for this Episode%    Precautions:         Subjective   Both of her shoulders are hurting her a little more. 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 She also has pain in both shoulders rather than just the one shoulder. She feels like her strength has gotten a lot better. She does still have the difference in sensation..  Pain reported as 5/10. L neck    Objective            Treatment:  Therapeutic Exercise  TE 1: seated thoracic extension 1/2 foam roll with hands behind head 10"x20  TE 2: side lying open books 5"x20 ea  TE 3: UBE 4/4  TE 4: SNAG R cervical rotation 5"x20  Manual Therapy  MT 1: suboccipital release  MT 2:  and side glides to cervical spine grade ii-iii  Balance/Neuromuscular Re-Education  NMR 2: craniocervical flexion with BP 22-26mmHg 10"x10 ea  NMR 6: bilateral ER YTB 5" " 3x10  Therapeutic Activity  TA 1: supine D2 shoulder flexion YTB 3x10 ea  TA 2: standing scaption 3lb 2x10    Time Entry(in minutes):  Manual Therapy Time Entry: 10  Neuromuscular Re-Education Time Entry: 10  Therapeutic Activity Time Entry: 10  Therapeutic Exercise Time Entry: 21    Assessment & Plan   Assessment: Patient arrived to today's session with increase in soreness in bilateral shoulders 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  Progression was made with functional strengthening bilaterally with resisted scaption. She was appropriately challenged with progression without reports of pain. She would continue to benefit from skilled PT to address deficits to improve ability to perform functional tasks.        The patient will continue to benefit from skilled outpatient physical therapy in order to address the deficits listed in the problem list on the initial evaluation, provide patient and family education, and maximize the patients level of independence in the home and community environments.     The patient's spiritual, cultural, and educational needs were considered, and the patient is agreeable to the plan of care and goals.           Plan: cervical mobility, stability and postural strengthening exercises    Goals:   Active       long term goals        Patient will improve FOTO score to 69% to improve self perceived ability to perform functional tasks         Start:  05/22/25    Expected End:  08/14/25            Patient will improve left upper extremity strength to 4+/5 to improve ability to perform functional tasks        Start:  05/22/25    Expected End:  08/14/25            Patient will be able to put a 5lb object on a shelf overhead to improve ability to perform function tasks        Start:  05/22/25    Expected End:  08/14/25            Patient will be able to carry a 10lb object with little to no difficulty to improve ability to perform a functional task       Start:  05/22/25     Expected End:  08/14/25               short term goals        patient will be independent with Home exercise program to supplement therapy  (Met)       Start:  05/22/25    Expected End:  07/03/25    Resolved:  06/23/25         Patient will be improve cervical flexion to 60 degrees without pain to improve ability to look down without difficulty       Start:  05/22/25    Expected End:  07/03/25            Patient will improve scapular stabilizer strength on Left to 4-/5 to improve ability to perform functional tasks        Start:  05/22/25    Expected End:  07/03/25            Patient will be able to sleep without difficulty to improve quality of life  (Met)       Start:  05/22/25    Expected End:  07/03/25    Resolved:  06/23/25             Sanjuanita Alaniz, PT ,DPT,OCS  07/14/2025

## 2025-07-16 ENCOUNTER — CLINICAL SUPPORT (OUTPATIENT)
Dept: REHABILITATION | Facility: HOSPITAL | Age: 30
End: 2025-07-16
Payer: MEDICAID

## 2025-07-16 DIAGNOSIS — R29.2 DECREASED REFLEX: ICD-10-CM

## 2025-07-16 DIAGNOSIS — R29.898 DECREASED STRENGTH OF UPPER EXTREMITY: Primary | ICD-10-CM

## 2025-07-16 DIAGNOSIS — R29.3 POSTURAL IMBALANCE: ICD-10-CM

## 2025-07-16 DIAGNOSIS — R20.8 DECREASED SENSATION: ICD-10-CM

## 2025-07-16 PROCEDURE — 97110 THERAPEUTIC EXERCISES: CPT | Mod: PN

## 2025-07-16 NOTE — PROGRESS NOTES
"  Outpatient Rehab    Physical Therapy Visit    Patient Name: Shweta Dela Cruz  MRN: [id number]  YOB: 1995  Encounter Date: 7/16/2025    Therapy Diagnosis:   Encounter Diagnoses   Name Primary?    Decreased strength of upper extremity Yes    Decreased sensation     Decreased reflex     Postural imbalance      Physician: Nathaniel Degroot MD    Physician Orders: Eval and Treat  Medical Diagnosis: Acute pain of left shoulder  Surgical Diagnosis: Not applicable for this Episode   Surgical Date: Not applicable for this Episode  Days Since Last Surgery: Not applicable for this Episode    Visit # / Visits Authorized:  11 / 12  Insurance Authorization Period: 5/21/2025 to 12/31/2025  Date of Evaluation: 5/21/2025  Plan of Care Certification: 5/22/2025 to 8/14/2025      PT/PTA: PT   Number of PTA visits since last PT visit:0  Time In: 1403   Time Out: 1456  Total Time (in minutes): 53   Total Billable Time (in minutes): 53    FOTO:  Intake Score: 47%  Survey Score 2: 58%  Survey Score 3: Not applicable for this Episode%    Precautions:         Subjective   She is feeling a little sore today but she is doing okay overall..         Objective            Treatment:  Therapeutic Exercise  TE 1: seated thoracic extension 1/2 foam roll with hands behind head 10"x20  TE 2: side lying open books 5"x20 ea  TE 3: UBE 4/4  Balance/Neuromuscular Re-Education  NMR 2: craniocervical flexion with BP 22-30mmHg 10"x10 ea  NMR 6: bilateral ER RTB 5" 3x10  NMR 7: T's on physioball 5" 3x10 +1lb  NMR 8: bilateral ER with shoulder flexion RTB x10  Therapeutic Activity  TA 1: supine D2 shoulder flexion RTB 3x10 ea  TA 2: standing scaption 3lb 2x10    Time Entry(in minutes):  Neuromuscular Re-Education Time Entry: 28  Therapeutic Activity Time Entry: 10  Therapeutic Exercise Time Entry: 15    Assessment & Plan   Assessment: PT progressed today's session with prone T's and shoulder flexion with bilateral external rotation. " Appropriate level of challenge and fatigue was reported by patient. PT also noted muscle fasciculations on left upper extremity as compared to Right. Patient is slowly improving with overall strength and overhead function.        The patient will continue to benefit from skilled outpatient physical therapy in order to address the deficits listed in the problem list on the initial evaluation, provide patient and family education, and maximize the patients level of independence in the home and community environments.     The patient's spiritual, cultural, and educational needs were considered, and the patient is agreeable to the plan of care and goals.           Plan: cervical mobility, stability and postural strengthening exercises    Goals:   Active       long term goals        Patient will improve FOTO score to 69% to improve self perceived ability to perform functional tasks         Start:  05/22/25    Expected End:  08/14/25            Patient will improve left upper extremity strength to 4+/5 to improve ability to perform functional tasks        Start:  05/22/25    Expected End:  08/14/25            Patient will be able to put a 5lb object on a shelf overhead to improve ability to perform function tasks        Start:  05/22/25    Expected End:  08/14/25            Patient will be able to carry a 10lb object with little to no difficulty to improve ability to perform a functional task       Start:  05/22/25    Expected End:  08/14/25               short term goals        patient will be independent with Home exercise program to supplement therapy  (Met)       Start:  05/22/25    Expected End:  07/03/25    Resolved:  06/23/25         Patient will be improve cervical flexion to 60 degrees without pain to improve ability to look down without difficulty       Start:  05/22/25    Expected End:  07/03/25            Patient will improve scapular stabilizer strength on Left to 4-/5 to improve ability to perform  functional tasks        Start:  05/22/25    Expected End:  07/03/25            Patient will be able to sleep without difficulty to improve quality of life  (Met)       Start:  05/22/25    Expected End:  07/03/25    Resolved:  06/23/25             Sanjuanita Alaniz, PT ,DPT,OCS  07/16/2025

## 2025-07-21 ENCOUNTER — CLINICAL SUPPORT (OUTPATIENT)
Dept: REHABILITATION | Facility: HOSPITAL | Age: 30
End: 2025-07-21
Payer: MEDICAID

## 2025-07-21 DIAGNOSIS — R20.8 DECREASED SENSATION: ICD-10-CM

## 2025-07-21 DIAGNOSIS — R29.3 POSTURAL IMBALANCE: ICD-10-CM

## 2025-07-21 DIAGNOSIS — R29.2 DECREASED REFLEX: ICD-10-CM

## 2025-07-21 DIAGNOSIS — R29.898 DECREASED STRENGTH OF UPPER EXTREMITY: Primary | ICD-10-CM

## 2025-07-21 PROCEDURE — 97110 THERAPEUTIC EXERCISES: CPT | Mod: PN

## 2025-07-21 NOTE — PROGRESS NOTES
"  Outpatient Rehab    Physical Therapy Visit    Patient Name: Shweta Dela Cruz  MRN: [id number]  YOB: 1995  Encounter Date: 7/21/2025    Therapy Diagnosis:   Encounter Diagnoses   Name Primary?    Decreased strength of upper extremity Yes    Decreased sensation     Decreased reflex     Postural imbalance      Physician: Nathaniel Degroot MD    Physician Orders: Eval and Treat  Medical Diagnosis: Acute pain of left shoulder  Surgical Diagnosis: Not applicable for this Episode   Surgical Date: Not applicable for this Episode  Days Since Last Surgery: Not applicable for this Episode    Visit # / Visits Authorized:  12 / 12  Insurance Authorization Period: 5/21/2025 to 12/31/2025  Date of Evaluation: 5/21/2025  Plan of Care Certification: 5/22/2025 to 8/14/2025      PT/PTA: PT   Number of PTA visits since last PT visit:0  Time In: 1400   Time Out: 1458  Total Time (in minutes): 58   Total Billable Time (in minutes): 58    FOTO:  Intake Score: 47%  Survey Score 2: 58%  Survey Score 3: Not applicable for this Episode%    Precautions:         Subjective   She was feeling tight over the weekend but she was not really as active and she thinks that may have caused her to feel more tight..         Objective            Treatment:  Therapeutic Exercise  TE 1: seated thoracic extension 1/2 foam roll with hands behind head 10"x20  TE 2: side lying open books 5"x20 ea  TE 3: UBE 4/4 hills lvl 3  TE 4: SNAG R cervical rotation 5"x20  Balance/Neuromuscular Re-Education  NMR 2: craniocervical flexion with BP 22-30mmHg 10"x10 ea  NMR 6: bilateral ER RTB 5" 3x10  NMR 7: T's on physioball 5" 3x10 +1lb  NMR 8: bilateral ER with shoulder flexion RTB 2x10  Therapeutic Activity  TA 1: supine D2 shoulder flexion RTB 3x10 ea  TA 2: standing scaption 3lb 2x10    Time Entry(in minutes):  Neuromuscular Re-Education Time Entry: 28  Therapeutic Activity Time Entry: 10  Therapeutic Exercise Time Entry: 20    Assessment & Plan "   Assessment: PT continued to focus on strengthening in bilateral upper extremity during today's session. She continues to have greater difficulty on Left as compared to Right. However, she has improved in her ability to perform strengthening exercises.        The patient will continue to benefit from skilled outpatient physical therapy in order to address the deficits listed in the problem list on the initial evaluation, provide patient and family education, and maximize the patients level of independence in the home and community environments.     The patient's spiritual, cultural, and educational needs were considered, and the patient is agreeable to the plan of care and goals.           Plan:      Goals:   Active       long term goals        Patient will improve FOTO score to 69% to improve self perceived ability to perform functional tasks         Start:  05/22/25    Expected End:  08/14/25            Patient will improve left upper extremity strength to 4+/5 to improve ability to perform functional tasks        Start:  05/22/25    Expected End:  08/14/25            Patient will be able to put a 5lb object on a shelf overhead to improve ability to perform function tasks        Start:  05/22/25    Expected End:  08/14/25            Patient will be able to carry a 10lb object with little to no difficulty to improve ability to perform a functional task       Start:  05/22/25    Expected End:  08/14/25               short term goals        patient will be independent with Home exercise program to supplement therapy  (Met)       Start:  05/22/25    Expected End:  07/03/25    Resolved:  06/23/25         Patient will be improve cervical flexion to 60 degrees without pain to improve ability to look down without difficulty       Start:  05/22/25    Expected End:  07/03/25            Patient will improve scapular stabilizer strength on Left to 4-/5 to improve ability to perform functional tasks        Start:  05/22/25     Expected End:  07/03/25            Patient will be able to sleep without difficulty to improve quality of life  (Met)       Start:  05/22/25    Expected End:  07/03/25    Resolved:  06/23/25             Sanjuanita Alaniz, PT ,DPT,OCS  07/21/2025

## 2025-07-28 ENCOUNTER — CLINICAL SUPPORT (OUTPATIENT)
Dept: REHABILITATION | Facility: HOSPITAL | Age: 30
End: 2025-07-28
Payer: MEDICAID

## 2025-07-28 DIAGNOSIS — R29.2 DECREASED REFLEX: ICD-10-CM

## 2025-07-28 DIAGNOSIS — R29.898 DECREASED STRENGTH OF UPPER EXTREMITY: Primary | ICD-10-CM

## 2025-07-28 DIAGNOSIS — R20.8 DECREASED SENSATION: ICD-10-CM

## 2025-07-28 DIAGNOSIS — R29.3 POSTURAL IMBALANCE: ICD-10-CM

## 2025-07-28 PROCEDURE — 97110 THERAPEUTIC EXERCISES: CPT | Mod: PN,CQ

## 2025-07-28 NOTE — PROGRESS NOTES
"  Outpatient Rehab    Physical Therapy Visit    Patient Name: Shweta Dela Cruz  MRN: [id number]  YOB: 1995  Encounter Date: 7/28/2025    Therapy Diagnosis:   Encounter Diagnoses   Name Primary?    Decreased strength of upper extremity Yes    Decreased sensation     Decreased reflex     Postural imbalance      Physician: Nathaniel Degroot MD    Physician Orders: Eval and Treat  Medical Diagnosis: Acute pain of left shoulder  Surgical Diagnosis: Not applicable for this Episode   Surgical Date: Not applicable for this Episode  Days Since Last Surgery: Not applicable for this Episode    Visit # / Visits Authorized:  13 / 24  Insurance Authorization Period: 5/21/2025 to 9/26/2025  Date of Evaluation: 5/21/2025  Plan of Care Certification: 5/22/2025 to 8/14/2025      PT/PTA: PTA   Number of PTA visits since last PT visit:1  Time In: 1400   Time Out: 1500  Total Time (in minutes): 60   Total Billable Time (in minutes): 60    FOTO:  Intake Score (%): 47  Survey Score 2 (%): 58  Survey Score 3 (%): Not applicable for this Episode    Precautions:         Subjective   No new reports of pain upon arrival today, agreeable to PT session.    L neck    Objective            Treatment:  Therapeutic Exercise  TE 1: seated thoracic extension 1/2 foam roll with hands behind head 10"x20  TE 2: side lying open books 5"x20 ea  TE 3: UBE 4/4 hills lvl 3  TE 4: SNAG R cervical rotation 5"x20  Balance/Neuromuscular Re-Education  NMR 2: craniocervical flexion with BP 22-30mmHg 10"x10 ea  NMR 6: bilateral ER RTB 5" 3x10  NMR 7: T's on physioball 5" 3x10 +1lb  NMR 8: bilateral ER with shoulder flexion RTB 2x10  Therapeutic Activity  TA 1: supine D2 shoulder flexion RTB 3x10 ea  TA 2: standing scaption 3lb 2x10    Time Entry(in minutes):  Neuromuscular Re-Education Time Entry: 30  Therapeutic Activity Time Entry: 10  Therapeutic Exercise Time Entry: 20    Assessment & Plan   Assessment: Pt arrived to session with no new reports of " pain. Session completed with continued focus on strengthening in bilateral upper extremity. Demonstrated L shoulder weakness while performing T's on swiss ball. No adverse reactions to treatment.   Evaluation/Treatment Tolerance: Patient tolerated treatment well    The patient will continue to benefit from skilled outpatient physical therapy in order to address the deficits listed in the problem list on the initial evaluation, provide patient and family education, and maximize the patients level of independence in the home and community environments.     The patient's spiritual, cultural, and educational needs were considered, and the patient is agreeable to the plan of care and goals.           Plan: cervical mobility, stability and postural strengthening exercises    Goals:   Active       long term goals        Patient will improve FOTO score to 69% to improve self perceived ability to perform functional tasks         Start:  05/22/25    Expected End:  08/14/25            Patient will improve left upper extremity strength to 4+/5 to improve ability to perform functional tasks        Start:  05/22/25    Expected End:  08/14/25            Patient will be able to put a 5lb object on a shelf overhead to improve ability to perform function tasks        Start:  05/22/25    Expected End:  08/14/25            Patient will be able to carry a 10lb object with little to no difficulty to improve ability to perform a functional task       Start:  05/22/25    Expected End:  08/14/25               short term goals        patient will be independent with Home exercise program to supplement therapy  (Met)       Start:  05/22/25    Expected End:  07/03/25    Resolved:  06/23/25         Patient will be improve cervical flexion to 60 degrees without pain to improve ability to look down without difficulty       Start:  05/22/25    Expected End:  07/03/25            Patient will improve scapular stabilizer strength on Left to 4-/5 to  improve ability to perform functional tasks        Start:  05/22/25    Expected End:  07/03/25            Patient will be able to sleep without difficulty to improve quality of life  (Met)       Start:  05/22/25    Expected End:  07/03/25    Resolved:  06/23/25             Jorge Sandoval PTA

## 2025-07-29 ENCOUNTER — OFFICE VISIT (OUTPATIENT)
Dept: PRIMARY CARE CLINIC | Facility: CLINIC | Age: 30
End: 2025-07-29
Payer: MEDICAID

## 2025-07-29 VITALS
OXYGEN SATURATION: 98 % | WEIGHT: 124.31 LBS | DIASTOLIC BLOOD PRESSURE: 78 MMHG | HEIGHT: 63 IN | SYSTOLIC BLOOD PRESSURE: 126 MMHG | HEART RATE: 61 BPM | BODY MASS INDEX: 22.03 KG/M2

## 2025-07-29 DIAGNOSIS — M25.512 CHRONIC PAIN IN LEFT SHOULDER: Primary | ICD-10-CM

## 2025-07-29 DIAGNOSIS — G89.29 CHRONIC PAIN IN LEFT SHOULDER: Primary | ICD-10-CM

## 2025-07-29 PROCEDURE — 3078F DIAST BP <80 MM HG: CPT | Mod: CPTII,,,

## 2025-07-29 PROCEDURE — 3008F BODY MASS INDEX DOCD: CPT | Mod: CPTII,,,

## 2025-07-29 PROCEDURE — 1159F MED LIST DOCD IN RCRD: CPT | Mod: CPTII,,,

## 2025-07-29 PROCEDURE — 1160F RVW MEDS BY RX/DR IN RCRD: CPT | Mod: CPTII,,,

## 2025-07-29 PROCEDURE — 99999 PR PBB SHADOW E&M-EST. PATIENT-LVL III: CPT | Mod: PBBFAC,,,

## 2025-07-29 PROCEDURE — 3044F HG A1C LEVEL LT 7.0%: CPT | Mod: CPTII,,,

## 2025-07-29 PROCEDURE — G2211 COMPLEX E/M VISIT ADD ON: HCPCS | Mod: ,,,

## 2025-07-29 PROCEDURE — 99213 OFFICE O/P EST LOW 20 MIN: CPT | Mod: S$PBB,,,

## 2025-07-29 PROCEDURE — 99213 OFFICE O/P EST LOW 20 MIN: CPT | Mod: PBBFAC,PN

## 2025-07-29 PROCEDURE — 3074F SYST BP LT 130 MM HG: CPT | Mod: CPTII,,,

## 2025-07-30 ENCOUNTER — CLINICAL SUPPORT (OUTPATIENT)
Dept: REHABILITATION | Facility: HOSPITAL | Age: 30
End: 2025-07-30
Payer: MEDICAID

## 2025-07-30 DIAGNOSIS — R29.2 DECREASED REFLEX: ICD-10-CM

## 2025-07-30 DIAGNOSIS — R29.3 POSTURAL IMBALANCE: ICD-10-CM

## 2025-07-30 DIAGNOSIS — R20.8 DECREASED SENSATION: ICD-10-CM

## 2025-07-30 DIAGNOSIS — R29.898 DECREASED STRENGTH OF UPPER EXTREMITY: Primary | ICD-10-CM

## 2025-07-30 PROCEDURE — 97110 THERAPEUTIC EXERCISES: CPT | Mod: PN

## 2025-07-30 NOTE — PROGRESS NOTES
"  Outpatient Rehab    Physical Therapy Visit    Patient Name: Shweta Dela Cruz  MRN: [id number]  YOB: 1995  Encounter Date: 7/30/2025    Therapy Diagnosis:   Encounter Diagnoses   Name Primary?    Decreased strength of upper extremity Yes    Decreased sensation     Decreased reflex     Postural imbalance      Physician: Nathaniel Degroot MD    Physician Orders: Eval and Treat  Medical Diagnosis: Acute pain of left shoulder  Surgical Diagnosis: Not applicable for this Episode   Surgical Date: Not applicable for this Episode  Days Since Last Surgery: Not applicable for this Episode    Visit # / Visits Authorized:  14 / 24  Insurance Authorization Period: 5/21/2025 to 9/26/2025  Date of Evaluation: 5/21/2025  Plan of Care Certification: 5/22/2025 to 8/14/2025      PT/PTA:     Number of PTA visits since last PT visit:   Time In: 1404   Time Out: 1453  Total Time (in minutes): 49   Total Billable Time (in minutes): 49    FOTO:  Intake Score (%): 47  Survey Score 2 (%): 58  Survey Score 3 (%): Not applicable for this Episode    Precautions:         Subjective   She is seeing the doctor and they said that she does not need to get the MRI because she is getting better and the only reason that they would schedule an MRI would be for a potential surgery..  Pain reported as 0/10. L neck    Objective            Treatment:  Therapeutic Exercise  TE 1: seated thoracic extension 1/2 foam roll with hands behind head 10"x20  TE 2: side lying open books 5"x20 ea  TE 3: UBE 4/4 hills lvl 3  TE 4: SNAG R cervical rotation 5"x20  Balance/Neuromuscular Re-Education  NMR 6: bilateral ER RTB 5" 3x10  NMR 7: T's on physioball 5" 3x10 +1lb  NMR 8: bilateral ER with shoulder flexion RTB 2x10  Therapeutic Activity  TA 1: supine D2 shoulder flexion RTB 3x15 ea  TA 2: standing scaption 3lb 3x10    Time Entry(in minutes):  Neuromuscular Re-Education Time Entry: 24  Therapeutic Activity Time Entry: 10  Therapeutic Exercise Time " Entry: 15    Assessment & Plan   Assessment: Patient continues to arrive to therapy without reports of pain. She was most challenged with bilateral external rotation with shoulder elevation as well as prone T's on physioball. PT to formally reassess at next visit.        The patient will continue to benefit from skilled outpatient physical therapy in order to address the deficits listed in the problem list on the initial evaluation, provide patient and family education, and maximize the patients level of independence in the home and community environments.     The patient's spiritual, cultural, and educational needs were considered, and the patient is agreeable to the plan of care and goals.           Plan: reassess NV    Goals:   Active       long term goals        Patient will improve FOTO score to 69% to improve self perceived ability to perform functional tasks         Start:  05/22/25    Expected End:  08/14/25            Patient will improve left upper extremity strength to 4+/5 to improve ability to perform functional tasks        Start:  05/22/25    Expected End:  08/14/25            Patient will be able to put a 5lb object on a shelf overhead to improve ability to perform function tasks        Start:  05/22/25    Expected End:  08/14/25            Patient will be able to carry a 10lb object with little to no difficulty to improve ability to perform a functional task       Start:  05/22/25    Expected End:  08/14/25               short term goals        patient will be independent with Home exercise program to supplement therapy  (Met)       Start:  05/22/25    Expected End:  07/03/25    Resolved:  06/23/25         Patient will be improve cervical flexion to 60 degrees without pain to improve ability to look down without difficulty       Start:  05/22/25    Expected End:  07/03/25            Patient will improve scapular stabilizer strength on Left to 4-/5 to improve ability to perform functional tasks         Start:  05/22/25    Expected End:  07/03/25            Patient will be able to sleep without difficulty to improve quality of life  (Met)       Start:  05/22/25    Expected End:  07/03/25    Resolved:  06/23/25             Sanjuanita Alaniz, PT ,DPT,OCS  07/30/2025

## 2025-07-31 NOTE — PROGRESS NOTES
"Subjective:       Patient ID: Shweta Dela Cruz is a 30 y.o. female.    Chief Complaint: Left shoulder pain  HPI    Patient came to clinic 2 months ago complaining of left shoulder pain and weakness. At that time patient had limited range of motion mostly on extension, lateral elevation, internal rotation.  Patient has been going to physical therapy.  Condition has been improving. Patient notes her range of motion is back to normal.  She still is going to physical therapy.    ROS negative except as above  Objective:      /78 (BP Location: Left arm, Patient Position: Sitting)   Pulse 61   Ht 5' 3" (1.6 m)   Wt 56.4 kg (124 lb 4.8 oz)   SpO2 98%   BMI 22.02 kg/m²    Physical Exam  Vitals reviewed.   Constitutional:       Appearance: Normal appearance.   HENT:      Head: Normocephalic.      Mouth/Throat:      Mouth: Mucous membranes are moist.   Cardiovascular:      Rate and Rhythm: Normal rate and regular rhythm.      Pulses: Normal pulses.      Heart sounds: Normal heart sounds.   Pulmonary:      Effort: Pulmonary effort is normal.      Breath sounds: Normal breath sounds. No wheezing or rhonchi.   Abdominal:      General: Abdomen is flat. There is no distension.   Musculoskeletal:         General: No swelling. Normal range of motion.      Cervical back: Normal range of motion.      Comments: Shoulder: left  Inspection:   No swelling, erythema or skin breakdown.  No atrophy or asymmetry.  Palpation:    No Spasm, Crepitus.  No Tenderness to Palpation       Active ROM:  Flexion:unrestricted   Extension:unrestricted   Abduction:unrestricted   External rot:unrestricted  Internal rot:unrestricted  Passive ROM:   As above     SPECIAL TESTS:  Rotator Cuff:         - Empty can: negative    - Full can: negative     - Subscap push-off:  negative  AC joint:   - AC compression: negative   - Crossed arm abd:negative  Impingement tests:        - Neer (I): negative     - Hawkin's (II): negative   Labral tests:         - " O'zora's:negative      - Axial Load & Grind:negative   - Bicep Load Test: negative   Skin:     General: Skin is warm.      Coloration: Skin is not jaundiced.   Neurological:      Mental Status: She is alert.         Assessment:       1. Chronic pain in left shoulder        Plan:       1. Chronic pain in left shoulder (Primary)  Condition significantly improving after physical therapy.  Range of motion is back to normal. Physical exam is normal.  No indication for MRI at this point. Patient to continue going to physical therapy and to continue doing HEP.  If condition worsens we will consider MRI.          Follow up in about 11 months (around 6/29/2026) for annual wellness visit.      Nathaniel Degroot M.D.    I spent a total of 20 minutes on the day of the visit.This includes face to face time and non-face to face time preparing to see the patient (eg, review of tests), obtaining and/or reviewing separately obtained history, documenting clinical information in the electronic or other health record, independently interpreting results and communicating results to the patient/family/caregiver, or care coordinator.

## 2025-08-06 ENCOUNTER — CLINICAL SUPPORT (OUTPATIENT)
Dept: REHABILITATION | Facility: HOSPITAL | Age: 30
End: 2025-08-06
Payer: MEDICAID

## 2025-08-06 DIAGNOSIS — R20.8 DECREASED SENSATION: ICD-10-CM

## 2025-08-06 DIAGNOSIS — R29.2 DECREASED REFLEX: ICD-10-CM

## 2025-08-06 DIAGNOSIS — R29.3 POSTURAL IMBALANCE: ICD-10-CM

## 2025-08-06 DIAGNOSIS — R29.898 DECREASED STRENGTH OF UPPER EXTREMITY: Primary | ICD-10-CM

## 2025-08-06 PROCEDURE — 97110 THERAPEUTIC EXERCISES: CPT | Mod: PN

## 2025-08-06 NOTE — PROGRESS NOTES
Outpatient Rehab    Physical Therapy Discharge    Patient Name: Shweta Dela Cruz  MRN: [id number]  YOB: 1995  Encounter Date: 8/6/2025    Therapy Diagnosis:   Encounter Diagnoses   Name Primary?    Decreased strength of upper extremity Yes    Decreased sensation     Decreased reflex     Postural imbalance      Physician: Nathaniel Degroot MD    Physician Orders: Eval and Treat  Medical Diagnosis: Acute pain of left shoulder  Surgical Diagnosis: Not applicable for this Episode   Surgical Date: Not applicable for this Episode  Days Since Last Surgery: Not applicable for this Episode    Visit # / Visits Authorized:  15 / 24  Insurance Authorization Period: 5/21/2025 to 9/26/2025  Date of Evaluation: 5/21/2025  Plan of Care Certification: 5/22/2025 to 8/14/2025      PT/PTA: PT   Number of PTA visits since last PT visit:0  Time In: 1400   Time Out: 1449  Total Time (in minutes): 49   Total Billable Time (in minutes): 49    FOTO:  Intake Score (%): 47  Survey Score 2 (%): 58  Survey Score 3 (%): 71    Precautions:       Subjective   Today is probably going to be her last visit since she moves next week..  Pain reported as 0/10.      Objective      Subcranial Range of Motion   Active Restricted? Passive Restricted? Pain   Flexion         Protraction         Retraction           Cervical Range of Motion   Active (deg) Passive (deg) Pain   Flexion 60       Extension         Right Lateral Flexion 40       Right Rotation         Left Lateral Flexion 40       Left Rotation                     Shoulder Strength - Planes of Motion   Right Strength Right Pain Left Strength Left  Pain   Flexion           Extension           ABduction           ADduction           Horizontal ABduction           Horizontal ADduction           Internal Rotation 0°           Internal Rotation 90°           External Rotation 0°           External Rotation 90°               Shoulder Strength - Scapular Stabilizing Muscles   Right  "Strength Right Pain Left Strength Left  Pain   Lower Trapezius 4-   4-     Middle Trapezius 4   4     Rhomboids                          Treatment:  Therapeutic Exercise  TE 3: UBE 4/4 hills lvl 3  Balance/Neuromuscular Re-Education  NMR 6: bilateral ER RTB 5" 3x10  NMR 7: T's on physioball 5" 3x10 +1lb  NMR 8: bilateral ER with shoulder flexion RTB 2x10  Therapeutic Activity  TA 1: standing D2 shoulder flexion RTB 3x15 ea  TA 2: reassessment    Time Entry(in minutes):  Neuromuscular Re-Education Time Entry: 26  Therapeutic Activity Time Entry: 15  Therapeutic Exercise Time Entry: 8    Assessment & Plan   Assessment:         The patient's spiritual, cultural, and educational needs were considered, and the patient is agreeable to the plan of care and goals.           Plan: discharged    Goals:   Active       long term goals        Patient will improve FOTO score to 69% to improve self perceived ability to perform functional tasks   (Met)       Start:  05/22/25    Expected End:  08/14/25    Resolved:  08/06/25         Patient will improve left upper extremity strength to 4+/5 to improve ability to perform functional tasks  (Adequate for Care Transition)       Start:  05/22/25    Expected End:  08/14/25            Patient will be able to put a 5lb object on a shelf overhead to improve ability to perform function tasks  (Met)       Start:  05/22/25    Expected End:  08/14/25    Resolved:  08/06/25         Patient will be able to carry a 10lb object with little to no difficulty to improve ability to perform a functional task (Met)       Start:  05/22/25    Expected End:  08/14/25    Resolved:  08/06/25           Resolved       short term goals        patient will be independent with Home exercise program to supplement therapy  (Met)       Start:  05/22/25    Expected End:  07/03/25    Resolved:  06/23/25         Patient will be improve cervical flexion to 60 degrees without pain to improve ability to look down without " difficulty (Met)       Start:  05/22/25    Expected End:  07/03/25    Resolved:  08/06/25         Patient will improve scapular stabilizer strength on Left to 4-/5 to improve ability to perform functional tasks  (Met)       Start:  05/22/25    Expected End:  07/03/25    Resolved:  08/06/25         Patient will be able to sleep without difficulty to improve quality of life  (Met)       Start:  05/22/25    Expected End:  07/03/25    Resolved:  06/23/25             Sanjuanita Alaniz, PT ,DPT,OCS  08/06/2025